# Patient Record
Sex: MALE | Race: BLACK OR AFRICAN AMERICAN | NOT HISPANIC OR LATINO | Employment: FULL TIME | URBAN - METROPOLITAN AREA
[De-identification: names, ages, dates, MRNs, and addresses within clinical notes are randomized per-mention and may not be internally consistent; named-entity substitution may affect disease eponyms.]

---

## 2023-03-19 PROBLEM — N20.0 KIDNEY STONES: Status: ACTIVE | Noted: 2023-03-19

## 2023-03-19 PROBLEM — M48.061 SPINAL STENOSIS OF LUMBAR REGION: Status: ACTIVE | Noted: 2023-03-19

## 2023-03-19 PROBLEM — Z86.0100 HISTORY OF COLON POLYPS: Status: ACTIVE | Noted: 2023-03-19

## 2023-03-19 PROBLEM — M43.16 SPONDYLOLISTHESIS, LUMBAR REGION: Status: ACTIVE | Noted: 2023-03-19

## 2023-03-19 PROBLEM — Z86.19 H/O HELICOBACTER INFECTION: Status: ACTIVE | Noted: 2023-03-19

## 2023-03-19 PROBLEM — G43.909 MIGRAINE WITHOUT STATUS MIGRAINOSUS, NOT INTRACTABLE: Status: ACTIVE | Noted: 2023-03-19

## 2023-03-19 PROBLEM — G44.219: Status: ACTIVE | Noted: 2023-03-19

## 2023-03-19 PROBLEM — Z85.51 HISTORY OF BLADDER CANCER: Status: ACTIVE | Noted: 2023-03-19

## 2023-03-19 PROBLEM — J38.3 VOCAL CORD DYSFUNCTION: Status: ACTIVE | Noted: 2023-03-19

## 2023-03-19 PROBLEM — M47.812 SPONDYLOSIS OF CERVICAL REGION WITHOUT MYELOPATHY OR RADICULOPATHY: Status: ACTIVE | Noted: 2023-03-19

## 2023-03-19 PROBLEM — E55.9 VITAMIN D DEFICIENCY: Status: ACTIVE | Noted: 2023-03-19

## 2023-03-19 PROBLEM — Z79.899 MEDICATION MANAGEMENT: Status: ACTIVE | Noted: 2023-03-19

## 2023-03-19 PROBLEM — Z86.010 HISTORY OF COLON POLYPS: Status: ACTIVE | Noted: 2023-03-19

## 2023-03-19 PROBLEM — M18.12 LOCALIZED PRIMARY OSTEOARTHRITIS OF CARPOMETACARPAL JOINT OF LEFT THUMB: Status: ACTIVE | Noted: 2023-03-19

## 2023-03-19 PROBLEM — Z85.850 HISTORY OF THYROID CANCER: Status: ACTIVE | Noted: 2023-03-19

## 2023-03-19 PROBLEM — M47.816 LUMBAR SPONDYLOSIS: Status: ACTIVE | Noted: 2023-03-19

## 2023-03-19 PROBLEM — Z00.00 ROUTINE ADULT HEALTH MAINTENANCE: Chronic | Status: ACTIVE | Noted: 2023-03-19

## 2023-03-19 PROBLEM — K44.9 HIATAL HERNIA: Status: ACTIVE | Noted: 2023-03-19

## 2023-03-19 PROBLEM — E61.1 IRON DEFICIENCY: Status: ACTIVE | Noted: 2023-03-19

## 2023-03-19 PROBLEM — E03.9 HYPOTHYROIDISM, ADULT: Status: ACTIVE | Noted: 2023-03-19

## 2023-03-19 PROBLEM — M51.9 LUMBAR DISC DISEASE: Status: ACTIVE | Noted: 2023-03-19

## 2023-03-19 PROBLEM — N40.0 BPH (BENIGN PROSTATIC HYPERPLASIA): Status: ACTIVE | Noted: 2023-03-19

## 2023-03-19 PROBLEM — K21.9 CHRONIC GERD: Status: ACTIVE | Noted: 2023-03-19

## 2023-03-19 PROBLEM — E80.4 GILBERT SYNDROME: Status: ACTIVE | Noted: 2023-03-19

## 2023-04-18 DIAGNOSIS — R79.89 ABNORMAL LIVER FUNCTION TESTS: Primary | ICD-10-CM

## 2023-04-18 LAB
ALANINE AMINOTRANSFERASE (SGPT) (U/L) IN SER/PLAS: 58 U/L (ref 10–52)
ALBUMIN (G/DL) IN SER/PLAS: 4.7 G/DL (ref 3.4–5)
ALKALINE PHOSPHATASE (U/L) IN SER/PLAS: 72 U/L (ref 33–120)
ANION GAP IN SER/PLAS: 11 MMOL/L (ref 10–20)
APPEARANCE, URINE: NORMAL
ASCORBIC ACID: NORMAL MG/DL
ASPARTATE AMINOTRANSFERASE (SGOT) (U/L) IN SER/PLAS: 29 U/L (ref 9–39)
BASOPHILS (10*3/UL) IN BLOOD BY AUTOMATED COUNT: 0.02 X10E9/L (ref 0–0.1)
BASOPHILS/100 LEUKOCYTES IN BLOOD BY AUTOMATED COUNT: 0.6 % (ref 0–2)
BILIRUBIN TOTAL (MG/DL) IN SER/PLAS: 1.4 MG/DL (ref 0–1.2)
BILIRUBIN, URINE: NORMAL
BLOOD, URINE: NORMAL
CALCIUM (MG/DL) IN SER/PLAS: 9.9 MG/DL (ref 8.6–10.3)
CARBON DIOXIDE, TOTAL (MMOL/L) IN SER/PLAS: 32 MMOL/L (ref 21–32)
CHLORIDE (MMOL/L) IN SER/PLAS: 102 MMOL/L (ref 98–107)
CHOLESTEROL (MG/DL) IN SER/PLAS: 159 MG/DL (ref 0–199)
CHOLESTEROL IN HDL (MG/DL) IN SER/PLAS: 43.4 MG/DL
CHOLESTEROL/HDL RATIO: 3.7
COLOR, URINE: NORMAL
CREATININE (MG/DL) IN SER/PLAS: 0.75 MG/DL (ref 0.5–1.3)
EOSINOPHILS (10*3/UL) IN BLOOD BY AUTOMATED COUNT: 0.01 X10E9/L (ref 0–0.7)
EOSINOPHILS/100 LEUKOCYTES IN BLOOD BY AUTOMATED COUNT: 0.3 % (ref 0–6)
ERYTHROCYTE DISTRIBUTION WIDTH (RATIO) BY AUTOMATED COUNT: 12.6 % (ref 11.5–14.5)
ERYTHROCYTE MEAN CORPUSCULAR HEMOGLOBIN CONCENTRATION (G/DL) BY AUTOMATED: 31.6 G/DL (ref 32–36)
ERYTHROCYTE MEAN CORPUSCULAR VOLUME (FL) BY AUTOMATED COUNT: 84 FL (ref 80–100)
ERYTHROCYTES (10*6/UL) IN BLOOD BY AUTOMATED COUNT: 6.17 X10E12/L (ref 4.5–5.9)
FERRITIN (UG/LL) IN SER/PLAS: 77 UG/L (ref 20–300)
GFR MALE: >90 ML/MIN/1.73M2
GLUCOSE (MG/DL) IN SER/PLAS: 98 MG/DL (ref 74–99)
GLUCOSE, URINE: NORMAL
HEMATOCRIT (%) IN BLOOD BY AUTOMATED COUNT: 51.6 % (ref 41–52)
HEMOGLOBIN (G/DL) IN BLOOD: 16.3 G/DL (ref 13.5–17.5)
IMMATURE GRANULOCYTES/100 LEUKOCYTES IN BLOOD BY AUTOMATED COUNT: 0.3 % (ref 0–0.9)
IRON (UG/DL) IN SER/PLAS: 118 UG/DL (ref 35–150)
IRON BINDING CAPACITY (UG/DL) IN SER/PLAS: 425 UG/DL (ref 240–445)
IRON SATURATION (%) IN SER/PLAS: 28 % (ref 25–45)
KETONES, URINE: NORMAL
LDL: 94 MG/DL (ref 0–99)
LEUKOCYTE ESTERASE, URINE: NORMAL
LEUKOCYTES (10*3/UL) IN BLOOD BY AUTOMATED COUNT: 3.3 X10E9/L (ref 4.4–11.3)
LYMPHOCYTES (10*3/UL) IN BLOOD BY AUTOMATED COUNT: 1.42 X10E9/L (ref 1.2–4.8)
LYMPHOCYTES/100 LEUKOCYTES IN BLOOD BY AUTOMATED COUNT: 43.7 % (ref 13–44)
MONOCYTES (10*3/UL) IN BLOOD BY AUTOMATED COUNT: 0.18 X10E9/L (ref 0.1–1)
MONOCYTES/100 LEUKOCYTES IN BLOOD BY AUTOMATED COUNT: 5.5 % (ref 2–10)
NEUTROPHILS (10*3/UL) IN BLOOD BY AUTOMATED COUNT: 1.61 X10E9/L (ref 1.2–7.7)
NEUTROPHILS/100 LEUKOCYTES IN BLOOD BY AUTOMATED COUNT: 49.6 % (ref 40–80)
NITRITE, URINE: NORMAL
NRBC (PER 100 WBCS) BY AUTOMATED COUNT: 0 /100 WBC (ref 0–0)
PH, URINE: NORMAL
PLATELETS (10*3/UL) IN BLOOD AUTOMATED COUNT: 183 X10E9/L (ref 150–450)
POTASSIUM (MMOL/L) IN SER/PLAS: 4.5 MMOL/L (ref 3.5–5.3)
PROTEIN TOTAL: 7.6 G/DL (ref 6.4–8.2)
PROTEIN, URINE: NORMAL
SODIUM (MMOL/L) IN SER/PLAS: 140 MMOL/L (ref 136–145)
SPECIFIC GRAVITY, URINE: NORMAL
THYROTROPIN (MIU/L) IN SER/PLAS BY DETECTION LIMIT <= 0.05 MIU/L: 0.1 MIU/L (ref 0.44–3.98)
THYROXINE (T4) FREE (NG/DL) IN SER/PLAS: 1.18 NG/DL (ref 0.61–1.12)
TRIGLYCERIDE (MG/DL) IN SER/PLAS: 108 MG/DL (ref 0–149)
UREA NITROGEN (MG/DL) IN SER/PLAS: 7 MG/DL (ref 6–23)
UROBILINOGEN, URINE: NORMAL
VLDL: 22 MG/DL (ref 0–40)

## 2023-04-19 LAB
CALCIDIOL (25 OH VITAMIN D3) (NG/ML) IN SER/PLAS: 40 NG/ML
COBALAMIN (VITAMIN B12) (PG/ML) IN SER/PLAS: 470 PG/ML (ref 211–911)

## 2023-04-21 LAB
PROSTATE SPECIFIC AG (NG/ML) IN SER/PLAS: 3.6 NG/ML (ref 0–4)
PROSTATE SPECIFIC AG FREE (NG/ML) IN SER/PLAS: 0.8 NG/ML
PROSTATE SPECIFIC AG FREE/PROSTATE SPECIFIC AG TOTAL IN SER/PLAS: 22 %

## 2023-04-25 ENCOUNTER — OFFICE VISIT (OUTPATIENT)
Dept: PRIMARY CARE | Facility: CLINIC | Age: 58
End: 2023-04-25
Payer: COMMERCIAL

## 2023-04-25 VITALS
OXYGEN SATURATION: 96 % | HEIGHT: 67 IN | SYSTOLIC BLOOD PRESSURE: 97 MMHG | HEART RATE: 99 BPM | WEIGHT: 146.5 LBS | BODY MASS INDEX: 22.99 KG/M2 | TEMPERATURE: 97.8 F | DIASTOLIC BLOOD PRESSURE: 61 MMHG

## 2023-04-25 DIAGNOSIS — N40.0 BENIGN PROSTATIC HYPERPLASIA, UNSPECIFIED WHETHER LOWER URINARY TRACT SYMPTOMS PRESENT: ICD-10-CM

## 2023-04-25 DIAGNOSIS — E61.1 IRON DEFICIENCY: ICD-10-CM

## 2023-04-25 DIAGNOSIS — Z00.00 ROUTINE ADULT HEALTH MAINTENANCE: Primary | Chronic | ICD-10-CM

## 2023-04-25 DIAGNOSIS — E03.9 HYPOTHYROIDISM, ADULT: ICD-10-CM

## 2023-04-25 DIAGNOSIS — R74.01 ELEVATED ALT MEASUREMENT: ICD-10-CM

## 2023-04-25 DIAGNOSIS — M47.812 SPONDYLOSIS OF CERVICAL REGION WITHOUT MYELOPATHY OR RADICULOPATHY: ICD-10-CM

## 2023-04-25 DIAGNOSIS — E55.9 VITAMIN D DEFICIENCY: ICD-10-CM

## 2023-04-25 DIAGNOSIS — K21.9 CHRONIC GERD: ICD-10-CM

## 2023-04-25 DIAGNOSIS — L60.9 NAIL ABNORMALITY: ICD-10-CM

## 2023-04-25 DIAGNOSIS — D72.819 LEUKOPENIA, UNSPECIFIED TYPE: ICD-10-CM

## 2023-04-25 PROCEDURE — 1036F TOBACCO NON-USER: CPT | Performed by: INTERNAL MEDICINE

## 2023-04-25 PROCEDURE — 99396 PREV VISIT EST AGE 40-64: CPT | Performed by: INTERNAL MEDICINE

## 2023-04-25 RX ORDER — ALFUZOSIN HYDROCHLORIDE 10 MG/1
1 TABLET, EXTENDED RELEASE ORAL NIGHTLY
COMMUNITY
Start: 2021-09-21 | End: 2023-09-01 | Stop reason: SDUPTHER

## 2023-04-25 RX ORDER — PANTOPRAZOLE SODIUM 20 MG/1
20 TABLET, DELAYED RELEASE ORAL
COMMUNITY
End: 2023-09-01 | Stop reason: SDUPTHER

## 2023-04-25 RX ORDER — FERROUS SULFATE 325(65) MG
65 TABLET ORAL
COMMUNITY
Start: 2020-07-21

## 2023-04-25 RX ORDER — DIAZEPAM 10 MG/1
10 TABLET ORAL EVERY 8 HOURS PRN
Qty: 21 TABLET | Refills: 0 | Status: SHIPPED | OUTPATIENT
Start: 2023-04-25 | End: 2024-03-19 | Stop reason: WASHOUT

## 2023-04-25 RX ORDER — MULTIVITAMIN
1 TABLET ORAL DAILY
COMMUNITY

## 2023-04-25 RX ORDER — LEVOTHYROXINE SODIUM 125 UG/1
125 TABLET ORAL
COMMUNITY
Start: 2018-05-21

## 2023-04-25 RX ORDER — DIAZEPAM 10 MG/1
1 TABLET ORAL 2 TIMES DAILY PRN
COMMUNITY
Start: 2019-03-08 | End: 2023-04-25 | Stop reason: SDUPTHER

## 2023-04-25 RX ORDER — CHOLECALCIFEROL (VITAMIN D3) 25 MCG
25 TABLET ORAL DAILY
COMMUNITY

## 2023-04-25 ASSESSMENT — PATIENT HEALTH QUESTIONNAIRE - PHQ9
SUM OF ALL RESPONSES TO PHQ9 QUESTIONS 1 AND 2: 0
2. FEELING DOWN, DEPRESSED OR HOPELESS: NOT AT ALL
1. LITTLE INTEREST OR PLEASURE IN DOING THINGS: NOT AT ALL

## 2023-04-25 NOTE — PROGRESS NOTES
Reason for Visit: Annual Physical Exam    Assessment and Plan:  Problem List Items Addressed This Visit          High    Routine adult health maintenance - Primary (Chronic)    Overview     TDaP 3/7/18  COVID-19 Moderna 3/10/21, 4/15/21, 11/18/21, 4/25/22, 12/9/22  FLu vaccine 2018, 10/1/19, 10/1/20, 10/1/22  Cscope 2015 (5yrs); 8/2020 (5 y)  6/29/20, PSA 3.9, 3.58 9/3/18; 1.56 4/9/21, 1.79 4/1/22 ; 4/21/23 3.6         Current Assessment & Plan     Annual Wellness exam completed   Preventive Health history reviewed:  Vaccines today: none  Labs ordered    Depression Screening done         Relevant Orders    Urinalysis with Reflex Microscopic    Comprehensive Metabolic Panel    CBC and Auto Differential    Lipid Panel       Medium    BPH (benign prostatic hyperplasia)    Overview     with elevated PSA  Has seen Urology at [facility]  On Flomax in past(dizziness, ED) until changed to Uroxatral in 10/21  9/18:  Prostate, left base, biopsy (A) - Benign prostate tissue.         Current Assessment & Plan     Seeing Urology soon for POC as symptoms persiting on max dose medication         Relevant Orders    PSA, Total and Free    Chronic GERD    Overview     On PPI         Hypothyroidism, adult    Overview     post thyroidectomy from papillary CA.   TSH needs to be <0.04  on synthroid         Current Assessment & Plan     Emailed Dr Vogt to inquire if any dose change needed  Await response         Relevant Orders    TSH with reflex to Free T4 if abnormal    Iron deficiency    Overview     on PO supp  continue         Relevant Orders    Iron and TIBC    Leukopenia    Overview     6/26/20: 2.9  Benign cause chava  Will monitor         Spondylosis of cervical region without myelopathy or radiculopathy    Overview     2/21 MRI: There is loss of height and signal and intervertebral disc spaces throughout the cervical spine. There are associated small bulging discs, uncovertebral joint osteophytes and facet hypertrophy. There is no  measurable canal stenosis, focal disc herniation, cord compression or nerve root compression  Uses Valium prn cervicogenic headaches         Current Assessment & Plan     Physical Therapy   Minnie Rodkey/[employer]  [phone]           Relevant Medications    diazePAM (Valium) 10 mg tablet    Vitamin D deficiency    Overview     Goal 30-40 given hx kidney stones  Supratherapeutic on 5K daily         Relevant Orders    Vitamin D, Total     Other Visit Diagnoses       Elevated ALT measurement        ? due to medication (NSAID/Tylenol)  Will repeat hydrated, nonfasting and off those meds  If still high will get US liver    Nail abnormality        Relevant Orders    Zinc    Referral to Dermatology              HPI: CPE   Fingernails are looking strange  Pt helping neck    Labs reviewed:  Component      Latest Ref Rng 4/18/2023   WBC      4.4 - 11.3 x10E9/L 3.3 (L)    nRBC      0.0 - 0.0 /100 WBC 0.0    RBC      4.50 - 5.90 x10E12/L 6.17 (H)    HEMOGLOBIN      13.5 - 17.5 g/dL 16.3    HEMATOCRIT      41.0 - 52.0 % 51.6    MCV      80 - 100 fL 84    MCHC      32.0 - 36.0 g/dL 31.6 (L)    Platelets      150 - 450 x10E9/L 183    RED CELL DISTRIBUTION WIDTH      11.5 - 14.5 % 12.6    Neutrophils %      40.0 - 80.0 % 49.6    Immature Granulocytes %, Automated      0.0 - 0.9 % 0.3    Lymphocytes %      13.0 - 44.0 % 43.7    Monocytes %      2.0 - 10.0 % 5.5    Eosinophils %      0.0 - 6.0 % 0.3    Basophils %      0.0 - 2.0 % 0.6    Neutrophils Absolute      1.20 - 7.70 x10E9/L 1.61    Lymphocytes Absolute      1.20 - 4.80 x10E9/L 1.42    Monocytes Absolute      0.10 - 1.00 x10E9/L 0.18    Eosinophils Absolute      0.00 - 0.70 x10E9/L 0.01    Basophils Absolute      0.00 - 0.10 x10E9/L 0.02    GLUCOSE      74 - 99 mg/dL 98    SODIUM      136 - 145 mmol/L 140    POTASSIUM      3.5 - 5.3 mmol/L 4.5    CHLORIDE      98 - 107 mmol/L 102    Bicarbonate      21 - 32 mmol/L 32    Anion Gap      10 - 20 mmol/L 11    Blood  "Urea Nitrogen      6 - 23 mg/dL 7    Creatinine      0.50 - 1.30 mg/dL 0.75    GFR MALE      >90 mL/min/1.73m2 >90    Calcium      8.6 - 10.3 mg/dL 9.9    Albumin      3.4 - 5.0 g/dL 4.7    Alkaline Phosphatase      33 - 120 U/L 72    Total Protein      6.4 - 8.2 g/dL 7.6    AST      9 - 39 U/L 29    Bilirubin Total      0.0 - 1.2 mg/dL 1.4 (H)    ALT      10 - 52 U/L 58 (H)    CHOLESTEROL      0 - 199 mg/dL 159    HDL CHOLESTEROL      mg/dL 43.4    Cholesterol/HDL Ratio 3.7    LDL      0 - 99 mg/dL 94    VLDL      0 - 40 mg/dL 22    TRIGLYCERIDES      0 - 149 mg/dL 108    PSA      0.0 - 4.0 ng/mL 3.6    PSA, Free      ng/mL 0.8    PSA, Free Pct      % 22    IRON      35 - 150 ug/dL 118    TIBC      240 - 445 ug/dL 425    % Saturation      25 - 45 % 28    Vitamin D, 25-Hydroxy, Total      ng/mL 40    FERRITIN      20 - 300 ug/L 77    Vitamin B12      211 - 911 pg/mL 470    Thyroid Stimulating Hormone      0.44 - 3.98 mIU/L 0.10 (L)    Thyroxine, Free      0.61 - 1.12 ng/dL 1.18 (H)    Sees Endo in July  No new meds  Uses Tylenol and Advil and was taking it at time of these labs      ROS otherwise negative aside from what was mentioned above in HPI.    Vitals  BP 97/61   Pulse 99   Temp 36.6 °C (97.8 °F)   Ht 1.689 m (5' 6.5\")   Wt 66.5 kg (146 lb 8 oz)   SpO2 96%   BMI 23.29 kg/m²   Body mass index is 23.29 kg/m².  Physical Exam  Gen: Alert, NAD  HEENT:  Normal exam  Neck:  No masses/nodes palpable; Thyroid nontender and without nodules; No EVELIO  Respiratory:  Lungs CTAB  CV: RRR  Neuro:  Gross motor and sensory intact  Skin:  No suspicious lesions present  Breast: No masses or axillary lymphadenopathy      Active Problem List  Patient Active Problem List   Diagnosis    BPH (benign prostatic hyperplasia)    Chronic GERD    Gilbert syndrome    H/O Helicobacter infection    Hiatal hernia    History of bladder cancer    History of colon polyps    History of thyroid cancer    Hypothyroidism, adult    Iron " deficiency    Kidney stones    Localized primary osteoarthritis of carpometacarpal joint of left thumb    Lumbar disc disease    Lumbar spondylosis    Spinal stenosis of lumbar region    Spondylolisthesis, lumbar region    Migraine without status migrainosus, not intractable    Spondylosis of cervical region without myelopathy or radiculopathy    TTH (tension-type headache), infrequent episodic type    Vitamin D deficiency    Routine adult health maintenance    Medication management    Vocal cord dysfunction    Leukopenia       Comprehensive Medical/Surgical/Social/Family History  Past Medical History:   Diagnosis Date    H/O CT scan     4/22: CT Calcium score: 0 The visualized segments of the lungs demonstrates minimal bullous emphysematous changes. There is basilar atelectasis.. 10/20: CT enterography: HH, moderately enlarged prostate 4/7/21: CT urogram: NO HYDRONEPHROSIS OR NEPHROLITHIASIS.  NO ABNORMALLY ENHANCED RENAL OR BLADDER MASS.  PROSTATE HYPERTROPHY.    H/O CT scan of abdomen     12/18: cyst left kidney 7mm, small HH 1/21: IMPRESSION: 3 mm obstructing calculus at the left ureterovesical junction resulting in mild hydroureter and hydronephrosis. Perinephric and periureteral stranding is most likely related to the obstruction but infection is not excluded. Prostatomegaly.    H/O CT scan of brain     12/18: normal    H/O CT scan of chest     2016: Few scattered bilateral tiny nodular/groundglass opacities <4mm    H/O diagnostic ultrasound     2015 US kidney: Normal; prostate gland is enlarged indenting the base of the urinary bladder , Left ureteral stent    H/O magnetic resonance imaging     2/12: mri SHOULDER: 2. Mild tendinosis of the distal supraspinatus tendon without discrete tear. 3. Chronic degenerative tear of the superior labrum within limits of non arthrographic study.    H/O magnetic resonance imaging of cervical spine     2/21: There is loss of height and signal and intervertebral disc spaces  throughout the cervical spine. There are associated small bulging discs, uncovertebral joint osteophytes and facet hypertrophy. There is no measurable canal stenosis, focal disc herniation, cord compression or nerve root compression    H/O magnetic resonance imaging of lumbar spine     2009: Disc protrusions on the left at L4-5 and L5-S1, with impingement . s/p PT and injections; Central and slightly left-sided disk protrusion L5/S1 2017: Multilevel discogenic and facet hypertrophic degenerative change as  described above. Left laminotomy at L4 and L5. Recurrent disc  protrusion versus granulation tissue/epidural scar at L4-L5 and contacts  the left L5 nerve root    History of PFTs     2015 (-)     Past Surgical History:   Procedure Laterality Date    BACK SURGERY  03/24/2020 7/19: revision decompression L5-S1 with fusion    BLADDER SURGERY  12/07/2018    Bladder CA removal 1/21 found incidentally on ureteroscopy low grade noninvasive urothelial cell carcinoma of the bladder    COLONOSCOPY  08/05/2020    5/15: Three polyps were found in the rectum.  measuring 3-5 mm; hyperplastic polyps; polyps (5 years) 8/2020: ileium normal; colon normal; no specimens; 5 y    CYSTOSCOPY  04/04/2022 4/23: Normal ;4/21: Normal ([facility]) 4/22: Normal aside from Prostate: Moderate lateral lobes with prolapse    ESOPHAGOGASTRODUODENOSCOPY  08/05/2020 2005: SMALL HIATAL HERNIA, CHRONIC GASTRITIS, negative for H Pylori 2012 : normal 8/2020: small hiatal hernia    LUMBAR LAMINECTOMY  08/14/2019 7/19/19: 1. revision L4-S1 leminectomy. 2. Vlia/radical decompression L5/S1, excision recurrent HNP. 3. PSF w instrumentation L5/S1. 4. TLIF w cage L5/S1    OTHER SURGICAL HISTORY  12/07/2018    Tonsillectomy    PROSTATE BIOPSY  03/24/2020 9/18: FINAL DIAGNOSIS  1. Prostate, left base, biopsy (A) - Benign prostate tissue.    THYROIDECTOMY  04/12/2021    Bladder surgery    UPPER GASTROINTESTINAL ENDOSCOPY  10/05/2020    Capsule endoscopy  10/20: ? debris vs ulceration noted at 3:57 35sec entended capsule time in stomach and at ileocecal valve Capsule remained in small bowel at end of study    URETEROSCOPY  2021: Left, stent placement, laser lithotripsy     Social History     Social History Narrative    , 3 daughters    Works as  for [employer]    Nonsmoker    No ETOH    ---    Family History:    F: Diabetes, CAD/MI  ( 74)    M Kidney stones, hypotension, OA    S: Migraine, RA                             S:        B:                                   Aunts/cousin with rectal CA; Aunt other side of family has rectal CA    2Aunt:  Colon Cancer       Allergies and Medications  Penicillins, Tamsulosin, Tizanidine, and Latex  Current Outpatient Medications   Medication Instructions    alfuzosin (Uroxatral) 10 mg 24 hr tablet 1 tablet, oral, Nightly    cholecalciferol (VITAMIN D-3) 25 mcg, oral, Daily    diazePAM (VALIUM) 10 mg, oral, Every 8 hours PRN    ferrous sulfate 325 (65 Fe) MG tablet oral    levothyroxine (SYNTHROID, LEVOXYL) 125 mcg, oral, Daily before breakfast    multivitamin tablet 1 tablet, oral, Daily    pantoprazole (PROTONIX) 20 mg, oral, Daily before breakfast

## 2023-04-25 NOTE — ASSESSMENT & PLAN NOTE
Annual Wellness exam completed   Preventive Health history reviewed:  Vaccines today: none  Labs ordered    Depression Screening done

## 2023-07-24 ENCOUNTER — PATIENT OUTREACH (OUTPATIENT)
Dept: CARE COORDINATION | Facility: CLINIC | Age: 58
End: 2023-07-24
Payer: COMMERCIAL

## 2023-07-24 NOTE — PROGRESS NOTES
Discharge Facility: [facility]  Discharge Diagnosis: UTI, recent urology surgery  Admission Date: 7/21/23  Discharge Date: 7/22/23    PCP Appointment Date: deferred  Specialist Appointment Date: TBD patient states he will get a call from office to see  Hospital Encounter and Summary: Linked  See discharge assessment below for further details    Engagement  Call Start Time: 1136 (7/24/2023 11:41 AM)    Medications  Medications reviewed with patient/caregiver?: Yes (7/24/2023 11:41 AM)  Is the patient having any side effects they believe may be caused by any medication additions or changes?: No (7/24/2023 11:41 AM)  Does the patient have all medications ordered at discharge?: Yes (7/24/2023 11:41 AM)  Care Management Interventions: Provided patient education (7/24/2023 11:41 AM)  Prescription Comments: Patient was on cardura in hospital explained to him per review of chart his alfuzosin appears to not have been in the formulary of the hospital pharmacy so this was why he was on this in the hospital. He will remain on alfuzosin outpatient, complete 10 days course of levofloxacin and use colace as needed (7/24/2023 11:41 AM)  Is the patient taking all medications as directed (includes completed medication regime)?: Yes (7/24/2023 11:41 AM)  Care Management Interventions: Advised patient to call provider (7/24/2023 11:41 AM)  Medication Comments: He will contact urology with any questions on urology related medications (7/24/2023 11:41 AM)    Appointments  Does the patient have a primary care provider?: Yes (7/24/2023 11:41 AM)  Care Management Interventions: Educated patient on importance of making appointment (7/24/2023 11:41 AM)  Has the patient kept scheduled appointments due by today?: Yes (7/24/2023 11:41 AM)  Care Management Interventions: Advised to schedule with specialist (7/24/2023 11:41 AM)    Patient Teaching  Does the patient have access to their discharge instructions?: Yes (7/24/2023 11:41 AM)  Care  Management Interventions: Reviewed instructions with patient (7/24/2023 11:41 AM)  What is the patient's perception of their health status since discharge?: Improving (7/24/2023 11:41 AM)  Is the patient/caregiver able to teach back the hierarchy of who to call/visit for symptoms/problems? PCP, Specialist, Home Health nurse, Urgent Care, ED, 911: Yes (7/24/2023 11:41 AM)  Patient/Caregiver Education Comments: He is aware to seek care with any further burning with urination or hematuria (7/24/2023 11:41 AM)    Wrap Up  Wrap Up Additional Comments: Prefers to see urology Dr Quesada within 1-2 weeks and will call PCP office if he is unable to get into urology soon within hospital discharge period. He had a laser prostatectomy on 7/19 prior to hospitalization. (7/24/2023 11:41 AM)  Call End Time: 1141 (7/24/2023 11:41 AM)

## 2023-08-04 ENCOUNTER — DOCUMENTATION (OUTPATIENT)
Dept: PRIMARY CARE | Facility: CLINIC | Age: 58
End: 2023-08-04
Payer: COMMERCIAL

## 2023-09-01 ENCOUNTER — OFFICE VISIT (OUTPATIENT)
Dept: PRIMARY CARE | Facility: CLINIC | Age: 58
End: 2023-09-01
Payer: COMMERCIAL

## 2023-09-01 VITALS
BODY MASS INDEX: 25.39 KG/M2 | DIASTOLIC BLOOD PRESSURE: 64 MMHG | HEART RATE: 82 BPM | HEIGHT: 66 IN | SYSTOLIC BLOOD PRESSURE: 100 MMHG | WEIGHT: 158 LBS

## 2023-09-01 DIAGNOSIS — M79.674 TOE PAIN, RIGHT: ICD-10-CM

## 2023-09-01 DIAGNOSIS — M21.70 LEG LENGTH DISCREPANCY: ICD-10-CM

## 2023-09-01 DIAGNOSIS — K21.9 CHRONIC GERD: ICD-10-CM

## 2023-09-01 DIAGNOSIS — M53.3 SACROILIAC DYSFUNCTION: Primary | ICD-10-CM

## 2023-09-01 PROCEDURE — 99214 OFFICE O/P EST MOD 30 MIN: CPT | Performed by: INTERNAL MEDICINE

## 2023-09-01 PROCEDURE — 1036F TOBACCO NON-USER: CPT | Performed by: INTERNAL MEDICINE

## 2023-09-01 RX ORDER — PANTOPRAZOLE SODIUM 20 MG/1
20 TABLET, DELAYED RELEASE ORAL
Qty: 90 TABLET | Refills: 3 | Status: SHIPPED | OUTPATIENT
Start: 2023-09-01

## 2023-09-01 RX ORDER — ALFUZOSIN HYDROCHLORIDE 10 MG/1
10 TABLET, EXTENDED RELEASE ORAL NIGHTLY
COMMUNITY
End: 2023-12-11 | Stop reason: ALTCHOICE

## 2023-09-01 RX ORDER — METHYLPREDNISOLONE 4 MG/1
TABLET ORAL
Qty: 21 TABLET | Refills: 0 | Status: SHIPPED | OUTPATIENT
Start: 2023-09-01 | End: 2023-09-08

## 2023-09-01 ASSESSMENT — PATIENT HEALTH QUESTIONNAIRE - PHQ9
2. FEELING DOWN, DEPRESSED OR HOPELESS: NOT AT ALL
1. LITTLE INTEREST OR PLEASURE IN DOING THINGS: NOT AT ALL
SUM OF ALL RESPONSES TO PHQ9 QUESTIONS 1 AND 2: 0

## 2023-09-01 NOTE — PROGRESS NOTES
CC/HPI: c/o lower back pain last week , left side  Can walk,sit,and lay down fine   It hurts from sitting to standing   Fine once up and walking around  No injury  Tried TENS and helped  Able to walk a mile and no pain    Right big toe joint  Pain on and off for a while   Persistent in last week   Denies injury   Hurts at rest and     Declines flu shot     Saw urology, reviewed note, had laser to prostate  Complicated by infection afterwards    No change in thryoid med per endo    He saw Minnie Sigala and it helped but too $$    Assessment and Plan:  Problem List Items Addressed This Visit          Medium    Chronic GERD    Overview     On PPI         Relevant Medications    pantoprazole (ProtoNix) 20 mg EC tablet     Other Visit Diagnoses       Sacroiliac dysfunction    -  Primary    Suspect cause for his pain  will try ART with Franchesca Alegria over weekend    Relevant Medications    methylPREDNISolone (Medrol Dospak) 4 mg tablets    Leg length discrepancy        Toe pain, right        Likley DJD  will image  May need podiatry  See if Franchesca can help    Relevant Medications    methylPREDNISolone (Medrol Dospak) 4 mg tablets    Other Relevant Orders    XR toe right 2+ views    Referral to Podiatry              Active Problem List  Patient Active Problem List   Diagnosis    BPH (benign prostatic hyperplasia)    Chronic GERD    Gilbert syndrome    H/O Helicobacter infection    Hiatal hernia    History of bladder cancer    History of colon polyps    History of thyroid cancer    Hypothyroidism, adult    Iron deficiency    Kidney stones    Localized primary osteoarthritis of carpometacarpal joint of left thumb    Lumbar disc disease    Lumbar spondylosis    Spinal stenosis of lumbar region    Spondylolisthesis, lumbar region    Migraine without status migrainosus, not intractable    Spondylosis of cervical region without myelopathy or radiculopathy    TTH (tension-type headache), infrequent episodic type    Vitamin D  deficiency    Routine adult health maintenance    Medication management    Vocal cord dysfunction    Leukopenia       Comprehensive Medical/Surgical/Social/Family History  Past Medical History:   Diagnosis Date    H/O CT scan     4/22: CT Calcium score: 0 The visualized segments of the lungs demonstrates minimal bullous emphysematous changes. There is basilar atelectasis.. 10/20: CT enterography: HH, moderately enlarged prostate 4/7/21: CT urogram: NO HYDRONEPHROSIS OR NEPHROLITHIASIS.  NO ABNORMALLY ENHANCED RENAL OR BLADDER MASS.  PROSTATE HYPERTROPHY.    H/O CT scan of abdomen     12/18: cyst left kidney 7mm, small HH 1/21: IMPRESSION: 3 mm obstructing calculus at the left ureterovesical junction resulting in mild hydroureter and hydronephrosis. Perinephric and periureteral stranding is most likely related to the obstruction but infection is not excluded. Prostatomegaly.    H/O CT scan of brain     12/18: normal    H/O CT scan of chest     2016: Few scattered bilateral tiny nodular/groundglass opacities <4mm    H/O diagnostic ultrasound     2015 US kidney: Normal; prostate gland is enlarged indenting the base of the urinary bladder , Left ureteral stent    H/O magnetic resonance imaging     2/12: mri SHOULDER: 2. Mild tendinosis of the distal supraspinatus tendon without discrete tear. 3. Chronic degenerative tear of the superior labrum within limits of non arthrographic study.    H/O magnetic resonance imaging of cervical spine     2/21: There is loss of height and signal and intervertebral disc spaces throughout the cervical spine. There are associated small bulging discs, uncovertebral joint osteophytes and facet hypertrophy. There is no measurable canal stenosis, focal disc herniation, cord compression or nerve root compression    H/O magnetic resonance imaging of lumbar spine     2009: Disc protrusions on the left at L4-5 and L5-S1, with impingement . s/p PT and injections; Central and slightly left-sided  disk protrusion L5/S1 2017: Multilevel discogenic and facet hypertrophic degenerative change as  described above. Left laminotomy at L4 and L5. Recurrent disc  protrusion versus granulation tissue/epidural scar at L4-L5 and contacts  the left L5 nerve root    History of PFTs     2015 (-)     Past Surgical History:   Procedure Laterality Date    BACK SURGERY  03/24/2020 7/19: revision decompression L5-S1 with fusion    BLADDER SURGERY  12/07/2018    Bladder CA removal 1/21 found incidentally on ureteroscopy low grade noninvasive urothelial cell carcinoma of the bladder    COLONOSCOPY  08/05/2020    5/15: Three polyps were found in the rectum.  measuring 3-5 mm; hyperplastic polyps; polyps (5 years) 8/2020: ileium normal; colon normal; no specimens; 5 y    CYSTOSCOPY  04/04/2022 4/23: Normal ;4/21: Normal ([facility]) 4/22: Normal aside from Prostate: Moderate lateral lobes with prolapse    ESOPHAGOGASTRODUODENOSCOPY  08/05/2020    2005: SMALL HIATAL HERNIA, CHRONIC GASTRITIS, negative for H Pylori 2012 : normal 8/2020: small hiatal hernia    LUMBAR LAMINECTOMY  08/14/2019 7/19/19: 1. revision L4-S1 leminectomy. 2. Vila/radical decompression L5/S1, excision recurrent HNP. 3. PSF w instrumentation L5/S1. 4. TLIF w cage L5/S1    OTHER SURGICAL HISTORY  12/07/2018    Tonsillectomy    PROSTATE BIOPSY  03/24/2020 9/18: FINAL DIAGNOSIS  1. Prostate, left base, biopsy (A) - Benign prostate tissue.    THYROIDECTOMY  04/12/2021    Bladder surgery    UPPER GASTROINTESTINAL ENDOSCOPY  10/05/2020    Capsule endoscopy 10/20: ? debris vs ulceration noted at 3:57 35sec entended capsule time in stomach and at ileocecal valve Capsule remained in small bowel at end of study    URETEROSCOPY  04/12/2021 2/21: Left, stent placement, laser lithotripsy     Social History     Social History Narrative    , 3 daughters    Works as  for [employer]    Nonsmoker    No ETOH    ---    Family History:    F: Diabetes, CAD/MI   "( 74)    M Kidney stones, hypotension, OA    S: Migraine, RA                             S:        B:                                   Aunts/cousin with rectal CA; Aunt other side of family has rectal CA    2Aunt:  Colon Cancer       Allergies and Medications  Penicillins, Tamsulosin, Tizanidine, and Latex  Current Outpatient Medications   Medication Instructions    alfuzosin (UROXATRAL) 10 mg, oral, Nightly, Do not crush, chew, or split.    cholecalciferol (VITAMIN D-3) 25 mcg, oral, Daily    diazePAM (VALIUM) 10 mg, oral, Every 8 hours PRN    ferrous sulfate 325 (65 Fe) MG tablet 65 mg of iron, oral, Weekly    levothyroxine (SYNTHROID, LEVOXYL) 125 mcg, oral, Daily before breakfast    methylPREDNISolone (Medrol Dospak) 4 mg tablets Take as directed on package.    multivitamin tablet 1 tablet, oral, Daily    pantoprazole (PROTONIX) 20 mg, oral, Daily before breakfast       ROS otherwise negative aside from what was mentioned above in HPI.    Vitals  /64   Pulse 82   Ht 1.676 m (5' 6\")   Wt 71.7 kg (158 lb)   BMI 25.50 kg/m²   Body mass index is 25.5 kg/m².  Physical Exam  Gen: Alert, NAD  Neuro:  Gross motor and sensory intact  Ext: RLE longer than left; Right great toe joint tender but not exquistely tender, no warmth or redness, decreased ROM  Back: Tender over left SIJ with + muscle knots notes around it    "

## 2023-12-11 ENCOUNTER — OFFICE VISIT (OUTPATIENT)
Dept: PODIATRY | Facility: CLINIC | Age: 58
End: 2023-12-11
Payer: COMMERCIAL

## 2023-12-11 DIAGNOSIS — M67.479 GANGLION CYST OF FOOT: ICD-10-CM

## 2023-12-11 DIAGNOSIS — M79.674 TOE PAIN, RIGHT: ICD-10-CM

## 2023-12-11 DIAGNOSIS — M79.672 PAIN IN BOTH FEET: Primary | ICD-10-CM

## 2023-12-11 DIAGNOSIS — M19.079 1ST MTP ARTHRITIS: ICD-10-CM

## 2023-12-11 DIAGNOSIS — M79.671 PAIN IN BOTH FEET: Primary | ICD-10-CM

## 2023-12-11 PROCEDURE — 99203 OFFICE O/P NEW LOW 30 MIN: CPT | Performed by: PODIATRIST

## 2023-12-11 PROCEDURE — 1036F TOBACCO NON-USER: CPT | Performed by: PODIATRIST

## 2023-12-11 NOTE — PROGRESS NOTES
History of Present Illness:   Patient states they are here for foot pain  Pain to right big toe joint  Has bump to left foot  Both can cause some pain  No improving treatments noted.   No DM  NO other pedal complaints at this time    Past Medical History  Past Medical History:   Diagnosis Date    H/O CT scan     4/22: CT Calcium score: 0 The visualized segments of the lungs demonstrates minimal bullous emphysematous changes. There is basilar atelectasis.. 10/20: CT enterography: HH, moderately enlarged prostate 4/7/21: CT urogram: NO HYDRONEPHROSIS OR NEPHROLITHIASIS.  NO ABNORMALLY ENHANCED RENAL OR BLADDER MASS.  PROSTATE HYPERTROPHY.    H/O CT scan of abdomen     12/18: cyst left kidney 7mm, small HH 1/21: IMPRESSION: 3 mm obstructing calculus at the left ureterovesical junction resulting in mild hydroureter and hydronephrosis. Perinephric and periureteral stranding is most likely related to the obstruction but infection is not excluded. Prostatomegaly.    H/O CT scan of brain     12/18: normal    H/O CT scan of chest     2016: Few scattered bilateral tiny nodular/groundglass opacities <4mm    H/O diagnostic ultrasound     2015 US kidney: Normal; prostate gland is enlarged indenting the base of the urinary bladder , Left ureteral stent    H/O magnetic resonance imaging     2/12: mri SHOULDER: 2. Mild tendinosis of the distal supraspinatus tendon without discrete tear. 3. Chronic degenerative tear of the superior labrum within limits of non arthrographic study.    H/O magnetic resonance imaging of cervical spine     2/21: There is loss of height and signal and intervertebral disc spaces throughout the cervical spine. There are associated small bulging discs, uncovertebral joint osteophytes and facet hypertrophy. There is no measurable canal stenosis, focal disc herniation, cord compression or nerve root compression    H/O magnetic resonance imaging of lumbar spine     2009: Disc protrusions on the left at L4-5  and L5-S1, with impingement . s/p PT and injections; Central and slightly left-sided disk protrusion L5/S1 2017: Multilevel discogenic and facet hypertrophic degenerative change as  described above. Left laminotomy at L4 and L5. Recurrent disc  protrusion versus granulation tissue/epidural scar at L4-L5 and contacts  the left L5 nerve root    History of PFTs     2015 (-)       Medications and Allergies have been reviewed.    Review Of Systems:  GENERAL: No weight loss, malaise or fevers.  HEENT: Negative for frequent or significant headaches,   RESPIRATORY: Negative for cough, wheezing or shortness of breath.  CARDIOVASCULAR: Negative for chest pain, leg swelling or palpitations.    Examination of Both Lower Extremities:   Objective:   Vasc: DP and PT pulses are palpable bilateral.  CFT is less than 3 seconds bilateral.  Skin temperature is warm to cool proximal to distal bilateral.      Neuro: Vibratory, light touch and proprioception are intact bilateral.      Derm: Nails 1-5 bilateral are intact.  Skin is supple with normal texture and turgor noted.  Webspaces are clean, dry and intact bilateral.  There are no hyperkeratoses, ulcerations, verruca or other lesions noted.  Dorsal bump to left lateral midfoot. No pain with palpation. Non mobile.    Ortho: Muscle strength is 5/5 for all pedal groups tested.  Pain to right 1st MPTJ with Rom. No edema or erythema noted    1. Pain in both feet        2. 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 Ganglion cyst of foot          Patient exam and eval  R 1st MTPJ pain with ROM.   Discussed stiff supportive shoe gear  Shoes that do not twist or bend  Discssed nsaids and topical biofreeze  Ganglion - discussed draining vs not draining  Deferred drain at this time  Will modify shoe gear  Fu prn  Patient was in agreement to this plan. All questions answered.      Nohemy Conroy,  HAYDEE  [phone]  [facility] [address]  Fax: [phone]

## 2024-03-19 ENCOUNTER — OFFICE VISIT (OUTPATIENT)
Dept: PRIMARY CARE | Facility: CLINIC | Age: 59
End: 2024-03-19
Payer: COMMERCIAL

## 2024-03-19 ENCOUNTER — TELEPHONE (OUTPATIENT)
Dept: PRIMARY CARE | Facility: CLINIC | Age: 59
End: 2024-03-19

## 2024-03-19 VITALS
DIASTOLIC BLOOD PRESSURE: 73 MMHG | WEIGHT: 151.25 LBS | HEART RATE: 64 BPM | BODY MASS INDEX: 23.74 KG/M2 | HEIGHT: 67 IN | OXYGEN SATURATION: 97 % | SYSTOLIC BLOOD PRESSURE: 113 MMHG | TEMPERATURE: 97.6 F

## 2024-03-19 DIAGNOSIS — M47.812 SPONDYLOSIS OF CERVICAL REGION WITHOUT MYELOPATHY OR RADICULOPATHY: ICD-10-CM

## 2024-03-19 DIAGNOSIS — G44.219 TTH (TENSION-TYPE HEADACHE), INFREQUENT EPISODIC TYPE: ICD-10-CM

## 2024-03-19 DIAGNOSIS — M54.81 BILATERAL OCCIPITAL NEURALGIA: Primary | ICD-10-CM

## 2024-03-19 PROBLEM — G43.909 MIGRAINE WITHOUT STATUS MIGRAINOSUS, NOT INTRACTABLE: Status: RESOLVED | Noted: 2023-03-19 | Resolved: 2024-03-19

## 2024-03-19 PROCEDURE — 1036F TOBACCO NON-USER: CPT | Performed by: INTERNAL MEDICINE

## 2024-03-19 PROCEDURE — 99214 OFFICE O/P EST MOD 30 MIN: CPT | Performed by: INTERNAL MEDICINE

## 2024-03-19 RX ORDER — METHOCARBAMOL 500 MG/1
500 TABLET, FILM COATED ORAL 3 TIMES DAILY PRN
COMMUNITY
Start: 2023-12-06 | End: 2024-03-19 | Stop reason: WASHOUT

## 2024-03-19 RX ORDER — GABAPENTIN 100 MG/1
100 CAPSULE ORAL 3 TIMES DAILY
Qty: 90 CAPSULE | Refills: 5 | Status: SHIPPED | OUTPATIENT
Start: 2024-03-19 | End: 2024-09-15

## 2024-03-19 RX ORDER — NORTRIPTYLINE HYDROCHLORIDE 10 MG/1
10 CAPSULE ORAL NIGHTLY
COMMUNITY
End: 2024-03-19 | Stop reason: WASHOUT

## 2024-03-19 RX ORDER — ALBUTEROL SULFATE 90 UG/1
1 AEROSOL, METERED RESPIRATORY (INHALATION) EVERY 4 HOURS PRN
COMMUNITY
Start: 2022-02-19 | End: 2024-03-19 | Stop reason: WASHOUT

## 2024-03-19 RX ORDER — DIAZEPAM 10 MG/1
20 TABLET ORAL EVERY 8 HOURS PRN
Qty: 42 TABLET | Refills: 0 | Status: SHIPPED | OUTPATIENT
Start: 2024-03-19 | End: 2024-03-19 | Stop reason: SDUPTHER

## 2024-03-19 RX ORDER — DIAZEPAM 10 MG/1
10 TABLET ORAL EVERY 6 HOURS PRN
Qty: 42 TABLET | Refills: 0 | Status: SHIPPED | OUTPATIENT
Start: 2024-03-19 | End: 2024-05-06

## 2024-03-19 RX ORDER — DOXAZOSIN 1 MG/1
1 TABLET ORAL DAILY
COMMUNITY
Start: 2023-07-23 | End: 2024-03-19 | Stop reason: WASHOUT

## 2024-03-19 ASSESSMENT — PATIENT HEALTH QUESTIONNAIRE - PHQ9
2. FEELING DOWN, DEPRESSED OR HOPELESS: NOT AT ALL
SUM OF ALL RESPONSES TO PHQ9 QUESTIONS 1 AND 2: 0
1. LITTLE INTEREST OR PLEASURE IN DOING THINGS: NOT AT ALL

## 2024-03-19 NOTE — TELEPHONE ENCOUNTER
He takes 1/2 doses prn so he never takes 20mg at one time  So can change sig to 10mg every 6 hours instead of 10mg every 12 hours if that's better for them  Reformat if needed

## 2024-03-19 NOTE — PROGRESS NOTES
CC/HPI:   Headache (Headaches are almost daily./OTC seem to longer work.).  HA are worsening  Tried Advil, Tylenol , ineffective  Muscle is so tense in side of the neck  HA runs up the back of head to the top  using Valium, helps but knocks him out and cannot function  Same with opiates  Tried PT  Tried Chiro  Had steroid trigger point injection, helped for a week  DiD acupuncture, ineffective  Used TENS and doesnt help anymore  Pain is sharp pain, unilateral  Can be either side  Left usually    MRI cervical spine 2021: Cervical spondylosis without canal or foraminal narrowing.     Saw Dr Brito at [facility] ([facility])- copied  Patient complaining of severe tightness in shoulders and neck that has progressed over the last 20 years. No obvious etiology is noted. No spasticity or dystonia is noted on examination. Stiffness and pain appear to be muscular in etiology. He would benefit from having an evaluation by Roselia Guy MD, [facility] regarding possible trigger point injections. I do not believe he is appropriate for botulinum toxin therapy at this point. He is to continue diazepam as previously prescribed. He is encouraged to continue to perform stretching exercises. He will discuss physical therapy with Dr. Guy. He will follow up with me as needed.     Saw Neuro F in 12/23 (Copied)  ASSESSMENT:  This is Elmo Yap is a 57 year old male with a history of hypertension who presents with headaches.    1.  Chronic tension headache  PLAN:   Start preventative namely nortriptyline 10 mg at bedtime.  Robaxin as directed  Any problems or concerns to call me or primary care physician immediately or go straight to the emergency department     Tried it but caised high BP and HR  Assessment and Plan:  Problem List Items Addressed This Visit          Medium    Spondylosis of cervical region without myelopathy or radiculopathy    Overview     2/21 MRI: There is loss of height and signal and intervertebral disc spaces throughout  "the cervical spine. There are associated small bulging discs, uncovertebral joint osteophytes and facet hypertrophy. There is no measurable canal stenosis, focal disc herniation, cord compression or nerve root compression  Uses Valium prn cervicogenic headaches         Relevant Medications    diazePAM (Valium) 10 mg tablet    TTH (tension-type headache), infrequent episodic type    Overview     Tension and migraine.   Valium daily prn          Other Visit Diagnoses       Bilateral occipital neuralgia    -  Primary    r/o compressive lesion  MRI brain  Trial gabapentin, uptitrate as able  Pain med consult for possible occipital nerve block    Relevant Medications    gabapentin (Neurontin) 100 mg capsule    Other Relevant Orders    Referral to Pain Medicine            ROS otherwise negative aside from what was mentioned above in HPI.    Vitals  /73   Pulse 64   Temp 36.4 °C (97.6 °F)   Ht 1.702 m (5' 7\")   Wt 68.6 kg (151 lb 4 oz)   SpO2 97%   BMI 23.69 kg/m²   Body mass index is 23.69 kg/m².  Physical Exam  Gen: Alert, NAD  HEENT: Unremarkable, PERRLA  Neck; Site of pain in the occipital insertion site for the trap muscle, minimally tight cervical muscles  Respiratory:  Lungs CTAB  CV: RRR  Neuro:  Gross motor and sensory intact, mild twitch of left side of mouth      Allergies and Medications  Penicillins, Pamelor [nortriptyline], Tamsulosin, Tizanidine, Vicodin [hydrocodone-acetaminophen], and Latex  Current Outpatient Medications   Medication Instructions    cholecalciferol (VITAMIN D-3) 25 mcg, oral, Daily    diazePAM (VALIUM) 20 mg, oral, Every 8 hours PRN    ferrous sulfate 325 (65 Fe) MG tablet 65 mg of iron, oral, Weekly    gabapentin (NEURONTIN) 100 mg, oral, 3 times daily    levothyroxine (SYNTHROID, LEVOXYL) 125 mcg, oral, Daily before breakfast    multivitamin tablet 1 tablet, oral, Daily    pantoprazole (PROTONIX) 20 mg, oral, Daily before breakfast         "

## 2024-03-19 NOTE — TELEPHONE ENCOUNTER
[facility] calling to clarify rx for valium wants to know if you want that sig as that is a lot   Please advise

## 2024-03-20 ENCOUNTER — HOSPITAL ENCOUNTER (OUTPATIENT)
Dept: RADIOLOGY | Facility: CLINIC | Age: 59
Discharge: HOME | End: 2024-03-20
Payer: COMMERCIAL

## 2024-03-20 DIAGNOSIS — M54.81 BILATERAL OCCIPITAL NEURALGIA: ICD-10-CM

## 2024-03-20 PROCEDURE — 70551 MRI BRAIN STEM W/O DYE: CPT | Performed by: RADIOLOGY

## 2024-03-20 PROCEDURE — 70551 MRI BRAIN STEM W/O DYE: CPT

## 2024-04-08 ENCOUNTER — OFFICE VISIT (OUTPATIENT)
Dept: PAIN MEDICINE | Facility: CLINIC | Age: 59
End: 2024-04-08
Payer: COMMERCIAL

## 2024-04-08 ENCOUNTER — HOSPITAL ENCOUNTER (OUTPATIENT)
Dept: RADIOLOGY | Facility: HOSPITAL | Age: 59
Discharge: HOME | End: 2024-04-08
Payer: COMMERCIAL

## 2024-04-08 VITALS
TEMPERATURE: 96.6 F | RESPIRATION RATE: 18 BRPM | DIASTOLIC BLOOD PRESSURE: 88 MMHG | OXYGEN SATURATION: 98 % | HEART RATE: 86 BPM | SYSTOLIC BLOOD PRESSURE: 139 MMHG

## 2024-04-08 DIAGNOSIS — M47.812 CERVICAL SPONDYLOSIS WITHOUT MYELOPATHY: Primary | ICD-10-CM

## 2024-04-08 DIAGNOSIS — M54.81 BILATERAL OCCIPITAL NEURALGIA: ICD-10-CM

## 2024-04-08 DIAGNOSIS — M47.812 CERVICAL SPONDYLOSIS WITHOUT MYELOPATHY: ICD-10-CM

## 2024-04-08 PROCEDURE — 72040 X-RAY EXAM NECK SPINE 2-3 VW: CPT

## 2024-04-08 PROCEDURE — 1036F TOBACCO NON-USER: CPT | Performed by: PAIN MEDICINE

## 2024-04-08 PROCEDURE — 72040 X-RAY EXAM NECK SPINE 2-3 VW: CPT | Performed by: RADIOLOGY

## 2024-04-08 PROCEDURE — 99204 OFFICE O/P NEW MOD 45 MIN: CPT | Performed by: PAIN MEDICINE

## 2024-04-08 PROCEDURE — 99214 OFFICE O/P EST MOD 30 MIN: CPT | Performed by: PAIN MEDICINE

## 2024-04-08 ASSESSMENT — PAIN SCALES - GENERAL: PAINLEVEL_OUTOF10: 3

## 2024-04-08 ASSESSMENT — PAIN - FUNCTIONAL ASSESSMENT: PAIN_FUNCTIONAL_ASSESSMENT: 0-10

## 2024-04-08 NOTE — PROGRESS NOTES
20 plus years has left side neck pain that goes up around to top of head  Had utilized gabapentin but did not notice much improvement  Does not recall any injections in the last 12 months

## 2024-04-08 NOTE — H&P
History Of Present Illness  Elmo Yap is a 58 y.o. male presenting with  20 plus years has left side neck pain that goes up around to top of head  Had utilized gabapentin but did not notice much improvement  Does not recall any injections in the last 12 months   Was tried on physical therapy including acupuncture and TENS unit but that did not provide him with any significant improvement currently his pain is mainly localized on the left side of his neck radiating up towards the head.  Was tried on Tylenol Advil Excedrin and Aleve without any significant improvement multiple medications were tried and they affected the blood pressure currently using on as needed basis the diazepam that he describes providing him with a significant muscle relaxation but makes him also feel sleepy.    Pain Assessment as of today    Pain Assessment   Pain Assessment 0-10   Pain Score 3   Pain Type Chronic pain   Pain Location Neck   Pain Orientation Left        Past Medical History  Past Medical History:   Diagnosis Date    H/O CT scan     4/22: CT Calcium score: 0 The visualized segments of the lungs demonstrates minimal bullous emphysematous changes. There is basilar atelectasis.. 10/20: CT enterography: HH, moderately enlarged prostate 4/7/21: CT urogram: NO HYDRONEPHROSIS OR NEPHROLITHIASIS.  NO ABNORMALLY ENHANCED RENAL OR BLADDER MASS.  PROSTATE HYPERTROPHY.    H/O CT scan of abdomen     12/18: cyst left kidney 7mm, small HH 1/21: IMPRESSION: 3 mm obstructing calculus at the left ureterovesical junction resulting in mild hydroureter and hydronephrosis. Perinephric and periureteral stranding is most likely related to the obstruction but infection is not excluded. Prostatomegaly.    H/O CT scan of brain     12/18: normal    H/O CT scan of chest     2016: Few scattered bilateral tiny nodular/groundglass opacities <4mm    H/O diagnostic ultrasound     2015 US kidney: Normal; prostate gland is enlarged indenting the base of the  urinary bladder , Left ureteral stent    H/O magnetic resonance imaging     2/12: mri SHOULDER: 2. Mild tendinosis of the distal supraspinatus tendon without discrete tear. 3. Chronic degenerative tear of the superior labrum within limits of non arthrographic study.    H/O magnetic resonance imaging of brain and brain stem 03/20/2024    Sequela of mild chronic small vessel ischemic changes, otherwise unremarkable MRI brain.    H/O magnetic resonance imaging of cervical spine     2/21: There is loss of height and signal and intervertebral disc spaces throughout the cervical spine. There are associated small bulging discs, uncovertebral joint osteophytes and facet hypertrophy. There is no measurable canal stenosis, focal disc herniation, cord compression or nerve root compression    H/O magnetic resonance imaging of lumbar spine     2009: Disc protrusions on the left at L4-5 and L5-S1, with impingement . s/p PT and injections; Central and slightly left-sided disk protrusion L5/S1 2017: Multilevel discogenic and facet hypertrophic degenerative change as  described above. Left laminotomy at L4 and L5. Recurrent disc  protrusion versus granulation tissue/epidural scar at L4-L5 and contacts  the left L5 nerve root    History of PFTs     2015 (-)       Surgical History  Past Surgical History:   Procedure Laterality Date    BACK SURGERY  03/24/2020 7/19: revision decompression L5-S1 with fusion    BLADDER SURGERY  12/07/2018    Bladder CA removal 1/21 found incidentally on ureteroscopy low grade noninvasive urothelial cell carcinoma of the bladder    COLONOSCOPY  08/05/2020    5/15: Three polyps were found in the rectum.  measuring 3-5 mm; hyperplastic polyps; polyps (5 years) 8/2020: ileium normal; colon normal; no specimens; 5 y    CYSTOSCOPY  04/04/2022 4/23: Normal ;4/21: Normal ([facility]) 4/22: Normal aside from Prostate: Moderate lateral lobes with prolapse    ESOPHAGOGASTRODUODENOSCOPY  08/05/2020    2005: SMALL  HIATAL HERNIA, CHRONIC GASTRITIS, negative for H Pylori  : normal 2020: small hiatal hernia    LUMBAR LAMINECTOMY  2019: 1. revision L4-S1 leminectomy. 2. Vila/radical decompression L5/S1, excision recurrent HNP. 3. PSF w instrumentation L5/S1. 4. TLIF w cage L5/S1    OTHER SURGICAL HISTORY  2018    Tonsillectomy    PROSTATE BIOPSY  2020: FINAL DIAGNOSIS  1. Prostate, left base, biopsy (A) - Benign prostate tissue.    THYROIDECTOMY  2021    Bladder surgery    UPPER GASTROINTESTINAL ENDOSCOPY  10/05/2020    Capsule endoscopy 10/20: ? debris vs ulceration noted at 3:57 35sec entended capsule time in stomach and at ileocecal valve Capsule remained in small bowel at end of study    URETEROSCOPY  2021: Left, stent placement, laser lithotripsy        Social History  He reports that he has never smoked. He has never used smokeless tobacco. He reports that he does not currently use alcohol. He reports that he does not use drugs.    Family History  Family History   Problem Relation Name Age of Onset    No Known Problems Mother          F: Diabetes, CAD/MI  ( 74) M Kidney stones, hypotension, OA S: Migraine, RA                          S:     B:                                Aunts/cousin with rectal CA; Aunt other side of family has rectal CA 2Aunt:  Colon Cancer        Allergies  Penicillins, Pamelor [nortriptyline], Tamsulosin, Tizanidine, Vicodin [hydrocodone-acetaminophen], and Latex    Review of Systems   12 Systems have been reviewed as follows.   Constitutional: Fever, weight gain, weight loss, appetite change, night sweats, fatigue, chills.  Eyes : blurry, double vision, vision, loss, tearing, redness, pain, sensitivity to light, glaucoma.  Ears, nose, mouth, and throat: Hearing loss, ringing in the ears, ear pain, nasal congestion, nasal drainage, nosebleeds, mouth, throat, irritation tooth problem.  Cardiovascular :chest pain, pressure, heart  tracing,palpaitations , sweating, leg swelling, high or low blood pressure  Pulmonary: Cough, yellow or green sputum, blood and sputum, shortness of breath, wheezing  Gastrointestinal: Nause, vomiting, diarrhea, constipation, pain, blood in stool, or vomitus, heartburn, difficulty swallowing  Genitourinary: incontinence, abnormal bleeding, abnormal discharge, urinary frequency, urinary hesitancy, pain, impotence sexual problem, infection, urinary retention  Musculoskeletal: Pain, stiffness, joint, redness or warmth, arthritis, back pain, weakness, muscle wasting, sprain or fracture  Neuro: Weight weakness, dizziness, change in voice, change in taste change in vision, change in hearing, loss, or change of sensation, trouble walking, balance problems coordination problems, shaking, speech problem  Endocrine , cold or heat intolerance, blood sugar problem, weight gain or loss missed periods hot flashes, sweats, change in body hair, change in libido, increased thirst, increased urination  Heme/lymph: Swelling, bleeding, problem anemia, bruising, enlarged lymph nodes  Allergic/immunologic: H. plus nasal drip, watery itchy eyes, nasal drainage, immunosuppressed  The above, were reviewed and noted negative except as noted.    Physical Exam   Vital signs reviewed, documented in chart     General:  Appears well, does not look in any major distress  Alert    HEENT:  Head atraumatic  Eyes normal inspection  PERRL  Normal ENT inspection  No signs of dehydration    NECK:  Normal inspection  Range of motion within normal provocative of pain on the rotation and flexion extension with some minimal decrease in the range of motion  Palpation of the cervical facet created tenderness around the left side  The left occipital nerve site is tender to palpation    RESPIRATORY:  No respiratory distress    CVS:  Heart rate and rhythm regular    ABDOMEN/GI  Soft  Non-tender  No distention  No organomegaly          EXTREMITIES:  Non-Tender  Full ROM  Normal appearance  No Pedal edema  Power symmetrical , sensory examination preserved.    NEURO:  Alert and oriented X 3  CNS normal as tested without focal neurological deficit   Sensation normal  Motor normal  reflexes normal    PSYCH:  Mood normal  Affect normal    SKIN:  Color normal  No rash  Warm  Dry  no sign of skin marking supportive of IV drug usage /abuse.    Last Recorded Vitals  Blood pressure 139/88, pulse 86, temperature 35.9 °C (96.6 °F), resp. rate 18, SpO2 98 %.    Relevant Results      cervical spine. .     FINDINGS:  The craniovertebral junction is normal. Marrow signal and vertebral  body height are normal. The cord is normal in size and signal. There  is loss of height and signal and intervertebral disc spaces  throughout the cervical spine. There are associated small bulging  discs, uncovertebral joint osteophytes and facet hypertrophy. There  is no measurable canal stenosis, focal disc herniation, cord  compression or nerve root compression. The soft tissues of the neck  are unremarkable.     IMPRESSION:  Cervical spondylosis without canal or foraminal narrowing.     [place] [facility] [place]arrative & Impression   Interpreted By:  Aric Jean,   STUDY:  MR BRAIN WO IV CONTRAST;  3/20/2024 9:13 am      INDICATION:  Signs/Symptoms:r/o lesion causing left sided occipital neuralgia pain  and twitching of mouth left side.      COMPARISON:  CT head from 12/17/2018.      ACCESSION NUMBER(S):  [id number]      ORDERING CLINICIAN:  ALLISON THORNTON      TECHNIQUE:  Axial T2, FLAIR, DWI, gradient echo T2 and sagittal and coronal T1  weighted images of brain were acquired.      FINDINGS:  CSF Spaces: The ventricles, sulci and basal cisterns are within  normal limits. There is no abnormal extra-axial fluid collection.      Parenchyma: There is no diffusion restriction abnormality to suggest  acute infarct.  There are few scattered foci of T2  hyperintensity  within the cerebral hemispheric white matter which are nonspecific  and are probably sequela of mild chronic small vessel ischemic  changes. There is no mass effect or midline shift.      Paranasal Sinuses and Mastoids: There is mild mucosal thickening  located within the floor of the maxillary sinuses and within the  ethmoid air cells. The mastoid air cells are clear.      IMPRESSION:  Sequela of mild chronic small vessel ischemic changes, otherwise  unremarkable MRI brain.      This study was interpreted at [facility].      MACRO:  None        Assessment/Plan     58 years old with history and physical examination supportive of cervical spondylosis cervicalgia tried and failed conservative management with physical therapy and nonsteroidal anti-inflammatory with the positive finding on imaging studies       Plan  Discussed with the patient the different modalities available for the treatment of his condition I would be obtaining an x-ray of the cervical spine area to check on the condition of his cervical osteophyte and degenerative disc disease  I recommend for the patient a diagnostic medial nerve branch block to be performed under fluoroscopic guidance targeting the left C2-C3 C3-C4 performed under x-ray guidance to confirm needle placement if the patient described positive response at that time could proceed with the denervation with a radiofrequency ablation of the left C2-C3 C3-C4 I will reevaluate the patient after the performance of the block for further recommendation as his case progresses benefits and risk were discussed and he would like to proceed      The above clinical summary has been dictated with voice recognition software. It has not been proofread for grammatical errors, typographical mistakes, or other semantic inconsistencies.    Thank you for visiting our office today. It was our pleasure to take part in your healthcare.     Please do not  hesitate to contact the pain clinic after your visit with any questions or concerns at  M-F 8-4 pm       Shannon Mills M.D.  Medical Director , Division of Pain Medicine [facility]   of Anesthesiology and Pain Medicine  [facility]     [facility]  [id number] [place] [address]     Office: [phone]  Fax: [phone]       Shannon Mills MD

## 2024-04-25 ENCOUNTER — LAB REQUISITION (OUTPATIENT)
Dept: LAB | Facility: HOSPITAL | Age: 59
End: 2024-04-25
Payer: COMMERCIAL

## 2024-04-25 DIAGNOSIS — N39.0 URINARY TRACT INFECTION, SITE NOT SPECIFIED: ICD-10-CM

## 2024-04-25 PROCEDURE — 87086 URINE CULTURE/COLONY COUNT: CPT

## 2024-04-27 LAB — BACTERIA UR CULT: ABNORMAL

## 2024-05-04 NOTE — PROGRESS NOTES
ANNUAL CPE VISIT  Chief Complaint   Patient presents with    Annual Exam     HPI: Gabapentin did help his neck   Also had TP injections that helped  Saw PM&R specialist at [facility] 4/15/24 and s/p trigger point injections Bilateral upper trapezius and left levator scapula  MRI done 3/24:  Sequela of mild chronic small vessel ischemic changes, otherwise unremarkable MRI brain.  XR cervical spine done 4/9/24: Degenerative changes include disc space narrowing, endplate spurring,  endplate sclerosis, uncovertebral spurring, posterior disc osteophyte complexes, and facet hypertrophy. There is mild subluxation at several levels, most likely related to facet degenerative disease.  Degenerative changes predominantly involve the C4-C5 and C5-C6 Levels    S/p pain mgmt appt 4/8/24:  Assessment/Plan   58 years old with history and physical examination supportive of cervical spondylosis cervicalgia tried and failed conservative management with physical therapy and nonsteroidal anti-inflammatory with the positive finding on imaging studies  Plan  Discussed with the patient the different modalities available for the treatment of his condition I would be obtaining an x-ray of the cervical spine area to check on the condition of his cervical osteophyte and degenerative disc disease  I recommend for the patient a diagnostic medial nerve branch block to be performed under fluoroscopic guidance targeting the left C2-C3 C3-C4 performed under x-ray guidance to confirm needle placement if the patient described positive response at that time could proceed with the denervation with a radiofrequency ablation of the left C2-C3 C3-C4 I will reevaluate the patient after the performance of the block for further recommendation as his case progresses benefits and risk were discussed and he would like to proceed    S/p cystoscopy 4/12/24 , normal  prostate mod lateral lobes; TUR defect. trabeculation mild, inflammation mild. MARI 60 g no nodules     Was  in UC with UTI/parainfluenza  Treated w/Bactrim  Fine now  20,000 - 80,000 Streptococcus mitis/oralis group Abnormal      Has lost 4 lbs since 3/24  Didnt stop or start any meds  Pain is better  More active  Also doing more lawncare    Assessment and Plan:  Problem List Items Addressed This Visit          High    Routine adult health maintenance - Primary (Chronic)    Overview     TDaP 3/7/18  COVID-19 Moderna 3/10/21, 4/15/21, 11/18/21, 4/25/22, 12/9/22  FLu vaccine 2018, 10/1/19, 10/1/20, 10/1/22  Cscope 2015 (5yrs); 8/2020 (5 y)  6/29/20, PSA 3.9, 3.58 9/3/18; 1.56 4/9/21, 1.79 4/1/22 ; 4/21/23 3.6         Current Assessment & Plan     Annual Wellness exam completed   Preventive Health History reviewed  Ordered:   Labs           Relevant Orders    Comprehensive Metabolic Panel    CBC and Auto Differential    Lipid Panel    Urinalysis with Reflex Culture and Microscopic       Medium    BPH (benign prostatic hyperplasia)    Overview     with elevated PSA  Has seen Urology at [facility]  On Flomax in past(dizziness, ED) until changed to Uroxatral in 10/21  9/18:  Prostate, left base, biopsy (A) - Benign prostate tissue.  S/p green light laser July 2023 (complicated by infection)         Relevant Orders    PSA, Total and Free    Chronic GERD    Overview     On PPI         History of bladder cancer    Overview     1/21: NONINVASIVE PAPILLARY UROTHELIAL CARCINOMA, LOW GRADE  s/p removal  4/21 CT Urogram and CYstoscopy normal  Urine Cytolgy 4/22/24: Negative for high-grade urothelial carcinoma.   Managed by Urology [facility]  Annual Cystoscopy needed: last 4/12/24 (-)         History of thyroid cancer    Overview     Papillary carcinoma - treated with surgery and radioactive iodine therapy  TSH should be 0.1 or higher per Endo (Epic message 5/4/23)         Relevant Orders    TSH with reflex to Free T4 if abnormal    Hypothyroidism, adult    Overview     post thyroidectomy from papillary CA.   TSH needs to be <0.04  on synthroid          Relevant Orders    TSH with reflex to Free T4 if abnormal    Iron deficiency    Overview     on PO supp  continue         Kidney stones    Overview     2/21: s/p 2/21: Left ureteroscopy, stent placement, laser lithotripsy  24hr urine showed inadequate volume, hypercalciuria and excess sodium  Rec'd to increase hydration, decrease sodum intake and increase citric acid intake  Urology manages, Dr Phoenix  GOal Vit D 30-40, VUt D decreased from 2000 IU to 1000IU daily         Relevant Orders    Vitamin D 25-Hydroxy,Total (for eval of Vitamin D levels)    Medication management    Overview     UDS, signed controlled substance contract, and OARRS check all up to date           Spondylosis of cervical region without myelopathy or radiculopathy    Overview     2/21 MRI: There is loss of height and signal and intervertebral disc spaces throughout the cervical spine. There are associated small bulging discs, uncovertebral joint osteophytes and facet hypertrophy. There is no measurable canal stenosis, focal disc herniation, cord compression or nerve root compression   XR 4/24: Degenerative changes include disc space narrowing, endplate spurring, endplate sclerosis, uncovertebral spurring, posterior disc osteophyte complexes, and facet hypertrophy.   There is mild subluxation at several levels, most likely related to facet degenerative disease.  Degenerative changes predominantly involve the C4-C5 and C5-C6 Levels  S/p Pain Med consult 4/8/24: Plan for diagnostic medial nerve branch block to be performed under fluoroscopic guidance targeting the left C2-C3 C3-C4 performed under x-ray guidance to confirm needle placement if the patient described positive response at that time could proceed with the denervation with a radiofrequency ablation of the left C2-C3 C3-C4   Saw PM&R specialist at [facility] 4/15/24 and s/p trigger point injections Bilateral upper trapezius and left levator scapula  Uses Valium prn cervicogenic headaches    "      Vitamin D deficiency    Overview     Goal 30-40 given hx kidney stones  Supratherapeutic on 5K daily          Other Visit Diagnoses       Weight loss        will get CXR and labs  Culd be due to more activity and less pain  if doesnt stabliize would do CT C/A/P    Relevant Orders    XR chest 2 views    Lactate dehydrogenase    Sedimentation Rate              ROS otherwise negative aside from what was mentioned above in HPI.    Vitals  /66   Pulse 73   Temp 35.8 °C (96.5 °F)   Ht 1.715 m (5' 7.5\")   Wt 66.7 kg (147 lb)   SpO2 95%   BMI 22.68 kg/m²   Body mass index is 22.68 kg/m².  Physical Exam  Gen: Alert, NAD  HEENT:  Normal exam  Neck:  No masses/nodes palpable; Thyroid nontender and without nodules; No EVELIO  Respiratory:  Lungs CTAB  CV: RRR  Neuro:  Gross motor and sensory intact  Skin:  No suspicious lesions present  Breast: No masses or axillary lymphadenopathy  MARI not done as just saw urology    Allergies and Medications  Penicillins, Pamelor [nortriptyline], Tamsulosin, Tizanidine, Vicodin [hydrocodone-acetaminophen], and Latex  Current Outpatient Medications   Medication Instructions    cholecalciferol (VITAMIN D-3) 25 mcg, oral, Daily    diazePAM (VALIUM) 10 mg, oral, Every 6 hours PRN    ferrous sulfate 325 (65 Fe) MG tablet 65 mg of iron, oral, Once Weekly    gabapentin (NEURONTIN) 100 mg, oral, 3 times daily    levothyroxine (SYNTHROID, LEVOXYL) 125 mcg, oral, Daily before breakfast    multivitamin tablet 1 tablet, oral, Daily    pantoprazole (PROTONIX) 20 mg, oral, Daily before breakfast       Active Problem List  Patient Active Problem List   Diagnosis    BPH (benign prostatic hyperplasia)    Chronic GERD    Gilbert syndrome    H/O Helicobacter infection    Hiatal hernia    History of bladder cancer    History of colon polyps    History of thyroid cancer    Hypothyroidism, adult    Iron deficiency    Kidney stones    Localized primary osteoarthritis of carpometacarpal joint of left " thumb    Lumbar disc disease    Lumbar spondylosis    Spinal stenosis of lumbar region    Spondylolisthesis, lumbar region    Spondylosis of cervical region without myelopathy or radiculopathy    TTH (tension-type headache), infrequent episodic type    Vitamin D deficiency    Routine adult health maintenance    Medication management    Vocal cord dysfunction    Leukopenia       Comprehensive Medical/Surgical/Social/Family History  Past Medical History:   Diagnosis Date    H/O cervical spine x-ray 04/09/2024    Degenerative changes include disc space narrowing, endplate spurring, endplate sclerosis, uncovertebral spurring, posterior disc osteophyte complexes, and facet hypertrophy. There is mild subluxation at several levels, most likely related to facet degenerative disease. Degenerative changes predominantly involve the C4-C5 and C5-C6 levels    H/O CT scan     4/22: CT Calcium score: 0 The visualized segments of the lungs demonstrates minimal bullous emphysematous changes. There is basilar atelectasis.. 10/20: CT enterography: HH, moderately enlarged prostate 4/7/21: CT urogram: NO HYDRONEPHROSIS OR NEPHROLITHIASIS.  NO ABNORMALLY ENHANCED RENAL OR BLADDER MASS.  PROSTATE HYPERTROPHY.    H/O CT scan of abdomen     12/18: cyst left kidney 7mm, small HH 1/21: IMPRESSION: 3 mm obstructing calculus at the left ureterovesical junction resulting in mild hydroureter and hydronephrosis. Perinephric and periureteral stranding is most likely related to the obstruction but infection is not excluded. Prostatomegaly.    H/O CT scan of brain     12/18: normal    H/O CT scan of chest     2016: Few scattered bilateral tiny nodular/groundglass opacities <4mm    H/O diagnostic ultrasound     2015 US kidney: Normal; prostate gland is enlarged indenting the base of the urinary bladder , Left ureteral stent    H/O magnetic resonance imaging     2/12: mri SHOULDER: 2. Mild tendinosis of the distal supraspinatus tendon without discrete  tear. 3. Chronic degenerative tear of the superior labrum within limits of non arthrographic study.    H/O magnetic resonance imaging of brain and brain stem 03/20/2024    Sequela of mild chronic small vessel ischemic changes, otherwise unremarkable MRI brain.    H/O magnetic resonance imaging of cervical spine     2/21: There is loss of height and signal and intervertebral disc spaces throughout the cervical spine. There are associated small bulging discs, uncovertebral joint osteophytes and facet hypertrophy. There is no measurable canal stenosis, focal disc herniation, cord compression or nerve root compression    H/O magnetic resonance imaging of lumbar spine     2009: Disc protrusions on the left at L4-5 and L5-S1, with impingement . s/p PT and injections; Central and slightly left-sided disk protrusion L5/S1 2017: Multilevel discogenic and facet hypertrophic degenerative change as  described above. Left laminotomy at L4 and L5. Recurrent disc  protrusion versus granulation tissue/epidural scar at L4-L5 and contacts  the left L5 nerve root    History of PFTs     2015 (-)     Past Surgical History:   Procedure Laterality Date    BACK SURGERY  03/24/2020 7/19: revision decompression L5-S1 with fusion    BLADDER SURGERY  12/07/2018    Bladder CA removal 1/21 found incidentally on ureteroscopy low grade noninvasive urothelial cell carcinoma of the bladder    COLONOSCOPY  08/05/2020    5/15: Three polyps were found in the rectum.  measuring 3-5 mm; hyperplastic polyps; polyps (5 years) 8/2020: ileium normal; colon normal; no specimens; 5 y    CYSTOSCOPY  04/04/2022 4/23: Normal ;4/21: Normal ([facility]) 4/22: Normal aside from Prostate: Moderate lateral lobes with prolapse    CYSTOSCOPY  04/12/2024    prostate mod lateral lobes; TUR defect. trabeculation mild, inflammation mild. MARI 60 g no nodules    ESOPHAGOGASTRODUODENOSCOPY  08/05/2020    2005: SMALL HIATAL HERNIA, CHRONIC GASTRITIS, negative for H Pylori 2012 :  normal 2020: small hiatal hernia    LUMBAR LAMINECTOMY  2019: 1. revision L4-S1 leminectomy. 2. Vila/radical decompression L5/S1, excision recurrent HNP. 3. PSF w instrumentation L5/S1. 4. TLIF w cage L5/S1    OTHER SURGICAL HISTORY  2018    Tonsillectomy    PROSTATE BIOPSY  2020: FINAL DIAGNOSIS  1. Prostate, left base, biopsy (A) - Benign prostate tissue.    THYROIDECTOMY  2021    Bladder surgery    UPPER GASTROINTESTINAL ENDOSCOPY  10/05/2020    Capsule endoscopy 10/20: ? debris vs ulceration noted at 3:57 35sec entended capsule time in stomach and at ileocecal valve Capsule remained in small bowel at end of study    URETEROSCOPY  2021: Left, stent placement, laser lithotripsy       Social History     Social History Narrative    Social History:    , 3 daughters    Works as  for [employer]    Nonsmoker    No ETOH    ---    Family History:    F: Diabetes, CAD/MI  ( 74)    M Kidney stones, hypotension, OA    S: Migraine, RA                             S:        B:                                   Aunts/cousin with rectal CA; Aunt other side of family has rectal CA    2Aunt:  Colon Cancer

## 2024-05-06 ENCOUNTER — LAB (OUTPATIENT)
Dept: LAB | Facility: LAB | Age: 59
End: 2024-05-06
Payer: COMMERCIAL

## 2024-05-06 ENCOUNTER — OFFICE VISIT (OUTPATIENT)
Dept: PRIMARY CARE | Facility: CLINIC | Age: 59
End: 2024-05-06
Payer: COMMERCIAL

## 2024-05-06 ENCOUNTER — HOSPITAL ENCOUNTER (OUTPATIENT)
Dept: RADIOLOGY | Facility: CLINIC | Age: 59
Discharge: HOME | End: 2024-05-06
Payer: COMMERCIAL

## 2024-05-06 VITALS
SYSTOLIC BLOOD PRESSURE: 110 MMHG | WEIGHT: 147 LBS | HEART RATE: 73 BPM | BODY MASS INDEX: 22.28 KG/M2 | TEMPERATURE: 96.5 F | HEIGHT: 68 IN | DIASTOLIC BLOOD PRESSURE: 66 MMHG | OXYGEN SATURATION: 95 %

## 2024-05-06 DIAGNOSIS — Z00.00 ROUTINE ADULT HEALTH MAINTENANCE: Primary | Chronic | ICD-10-CM

## 2024-05-06 DIAGNOSIS — R80.9 PROTEINURIA, UNSPECIFIED TYPE: Primary | ICD-10-CM

## 2024-05-06 DIAGNOSIS — E03.9 HYPOTHYROIDISM, ADULT: ICD-10-CM

## 2024-05-06 DIAGNOSIS — Z85.850 HISTORY OF THYROID CANCER: ICD-10-CM

## 2024-05-06 DIAGNOSIS — N40.0 BENIGN PROSTATIC HYPERPLASIA, UNSPECIFIED WHETHER LOWER URINARY TRACT SYMPTOMS PRESENT: ICD-10-CM

## 2024-05-06 DIAGNOSIS — R63.4 WEIGHT LOSS: ICD-10-CM

## 2024-05-06 DIAGNOSIS — Z79.899 MEDICATION MANAGEMENT: ICD-10-CM

## 2024-05-06 DIAGNOSIS — Z00.00 ROUTINE ADULT HEALTH MAINTENANCE: ICD-10-CM

## 2024-05-06 DIAGNOSIS — E55.9 VITAMIN D DEFICIENCY: ICD-10-CM

## 2024-05-06 DIAGNOSIS — K21.9 CHRONIC GERD: ICD-10-CM

## 2024-05-06 DIAGNOSIS — M47.812 SPONDYLOSIS OF CERVICAL REGION WITHOUT MYELOPATHY OR RADICULOPATHY: ICD-10-CM

## 2024-05-06 DIAGNOSIS — E61.1 IRON DEFICIENCY: ICD-10-CM

## 2024-05-06 DIAGNOSIS — N20.0 KIDNEY STONES: ICD-10-CM

## 2024-05-06 DIAGNOSIS — Z85.51 HISTORY OF BLADDER CANCER: ICD-10-CM

## 2024-05-06 LAB
25(OH)D3 SERPL-MCNC: 27 NG/ML (ref 30–100)
ALBUMIN SERPL BCP-MCNC: 4.3 G/DL (ref 3.4–5)
ALP SERPL-CCNC: 90 U/L (ref 33–120)
ALT SERPL W P-5'-P-CCNC: 32 U/L (ref 10–52)
ANION GAP SERPL CALC-SCNC: 7 MMOL/L (ref 10–20)
APPEARANCE UR: ABNORMAL
AST SERPL W P-5'-P-CCNC: 22 U/L (ref 9–39)
BASOPHILS # BLD AUTO: 0.02 X10*3/UL (ref 0–0.1)
BASOPHILS NFR BLD AUTO: 0.7 %
BILIRUB SERPL-MCNC: 0.9 MG/DL (ref 0–1.2)
BILIRUB UR STRIP.AUTO-MCNC: NEGATIVE MG/DL
BUN SERPL-MCNC: 11 MG/DL (ref 6–23)
CALCIUM SERPL-MCNC: 9.4 MG/DL (ref 8.6–10.3)
CHLORIDE SERPL-SCNC: 106 MMOL/L (ref 98–107)
CHOLEST SERPL-MCNC: 139 MG/DL (ref 0–199)
CHOLESTEROL/HDL RATIO: 4.1
CO2 SERPL-SCNC: 31 MMOL/L (ref 21–32)
COLOR UR: ABNORMAL
CREAT SERPL-MCNC: 0.85 MG/DL (ref 0.5–1.3)
EGFRCR SERPLBLD CKD-EPI 2021: >90 ML/MIN/1.73M*2
EOSINOPHIL # BLD AUTO: 0.03 X10*3/UL (ref 0–0.7)
EOSINOPHIL NFR BLD AUTO: 1 %
ERYTHROCYTE [DISTWIDTH] IN BLOOD BY AUTOMATED COUNT: 13 % (ref 11.5–14.5)
ERYTHROCYTE [SEDIMENTATION RATE] IN BLOOD BY WESTERGREN METHOD: 12 MM/H (ref 0–20)
GLUCOSE SERPL-MCNC: 95 MG/DL (ref 74–99)
GLUCOSE UR STRIP.AUTO-MCNC: NEGATIVE MG/DL
HCT VFR BLD AUTO: 49.7 % (ref 41–52)
HDLC SERPL-MCNC: 33.5 MG/DL
HGB BLD-MCNC: 15.8 G/DL (ref 13.5–17.5)
HOLD SPECIMEN: NORMAL
IMM GRANULOCYTES # BLD AUTO: 0 X10*3/UL (ref 0–0.7)
IMM GRANULOCYTES NFR BLD AUTO: 0 % (ref 0–0.9)
KETONES UR STRIP.AUTO-MCNC: NEGATIVE MG/DL
LDH SERPL L TO P-CCNC: 118 U/L (ref 84–246)
LDLC SERPL CALC-MCNC: 79 MG/DL
LEUKOCYTE ESTERASE UR QL STRIP.AUTO: NEGATIVE
LYMPHOCYTES # BLD AUTO: 1.28 X10*3/UL (ref 1.2–4.8)
LYMPHOCYTES NFR BLD AUTO: 44.8 %
MCH RBC QN AUTO: 26.6 PG (ref 26–34)
MCHC RBC AUTO-ENTMCNC: 31.8 G/DL (ref 32–36)
MCV RBC AUTO: 84 FL (ref 80–100)
MONOCYTES # BLD AUTO: 0.22 X10*3/UL (ref 0.1–1)
MONOCYTES NFR BLD AUTO: 7.7 %
MUCOUS THREADS #/AREA URNS AUTO: NORMAL /LPF
NEUTROPHILS # BLD AUTO: 1.31 X10*3/UL (ref 1.2–7.7)
NEUTROPHILS NFR BLD AUTO: 45.8 %
NITRITE UR QL STRIP.AUTO: NEGATIVE
NON HDL CHOLESTEROL: 106 MG/DL (ref 0–149)
NRBC BLD-RTO: 0 /100 WBCS (ref 0–0)
PH UR STRIP.AUTO: 6 [PH]
PLATELET # BLD AUTO: 226 X10*3/UL (ref 150–450)
POTASSIUM SERPL-SCNC: 4.8 MMOL/L (ref 3.5–5.3)
PROT SERPL-MCNC: 6.9 G/DL (ref 6.4–8.2)
PROT UR STRIP.AUTO-MCNC: ABNORMAL MG/DL
RBC # BLD AUTO: 5.94 X10*6/UL (ref 4.5–5.9)
RBC # UR STRIP.AUTO: NEGATIVE /UL
RBC #/AREA URNS AUTO: NORMAL /HPF
SODIUM SERPL-SCNC: 139 MMOL/L (ref 136–145)
SP GR UR STRIP.AUTO: 1.02
SQUAMOUS #/AREA URNS AUTO: NORMAL /HPF
T4 FREE SERPL-MCNC: 1.52 NG/DL (ref 0.61–1.12)
TRIGL SERPL-MCNC: 131 MG/DL (ref 0–149)
TSH SERPL-ACNC: 0.07 MIU/L (ref 0.44–3.98)
UROBILINOGEN UR STRIP.AUTO-MCNC: <2 MG/DL
VLDL: 26 MG/DL (ref 0–40)
WBC # BLD AUTO: 2.9 X10*3/UL (ref 4.4–11.3)
WBC #/AREA URNS AUTO: NORMAL /HPF

## 2024-05-06 PROCEDURE — 80053 COMPREHEN METABOLIC PANEL: CPT

## 2024-05-06 PROCEDURE — 82306 VITAMIN D 25 HYDROXY: CPT

## 2024-05-06 PROCEDURE — 84443 ASSAY THYROID STIM HORMONE: CPT

## 2024-05-06 PROCEDURE — 84439 ASSAY OF FREE THYROXINE: CPT

## 2024-05-06 PROCEDURE — 85025 COMPLETE CBC W/AUTO DIFF WBC: CPT

## 2024-05-06 PROCEDURE — 99396 PREV VISIT EST AGE 40-64: CPT | Performed by: INTERNAL MEDICINE

## 2024-05-06 PROCEDURE — 85652 RBC SED RATE AUTOMATED: CPT

## 2024-05-06 PROCEDURE — 81001 URINALYSIS AUTO W/SCOPE: CPT

## 2024-05-06 PROCEDURE — 83615 LACTATE (LD) (LDH) ENZYME: CPT

## 2024-05-06 PROCEDURE — 1036F TOBACCO NON-USER: CPT | Performed by: INTERNAL MEDICINE

## 2024-05-06 PROCEDURE — 84153 ASSAY OF PSA TOTAL: CPT

## 2024-05-06 PROCEDURE — 71046 X-RAY EXAM CHEST 2 VIEWS: CPT

## 2024-05-06 PROCEDURE — 36415 COLL VENOUS BLD VENIPUNCTURE: CPT

## 2024-05-06 PROCEDURE — 80061 LIPID PANEL: CPT

## 2024-05-06 PROCEDURE — 84154 ASSAY OF PSA FREE: CPT

## 2024-05-07 ENCOUNTER — APPOINTMENT (OUTPATIENT)
Dept: PAIN MEDICINE | Facility: CLINIC | Age: 59
End: 2024-05-07
Payer: COMMERCIAL

## 2024-05-08 LAB
PSA FREE MFR SERPL: 8 %
PSA FREE SERPL-MCNC: 0.4 NG/ML
PSA SERPL IA-MCNC: 5.1 NG/ML (ref 0–4)

## 2024-07-18 ENCOUNTER — PATIENT MESSAGE (OUTPATIENT)
Dept: PRIMARY CARE | Facility: CLINIC | Age: 59
End: 2024-07-18
Payer: COMMERCIAL

## 2024-07-19 ENCOUNTER — OFFICE VISIT (OUTPATIENT)
Dept: ORTHOPEDIC SURGERY | Facility: CLINIC | Age: 59
End: 2024-07-19
Payer: COMMERCIAL

## 2024-07-19 ENCOUNTER — HOSPITAL ENCOUNTER (OUTPATIENT)
Dept: RADIOLOGY | Facility: CLINIC | Age: 59
Discharge: HOME | End: 2024-07-19
Payer: COMMERCIAL

## 2024-07-19 DIAGNOSIS — M54.50 ACUTE LOW BACK PAIN, UNSPECIFIED BACK PAIN LATERALITY, UNSPECIFIED WHETHER SCIATICA PRESENT: ICD-10-CM

## 2024-07-19 DIAGNOSIS — M54.16 LUMBAR RADICULOPATHY: Primary | ICD-10-CM

## 2024-07-19 DIAGNOSIS — S39.012A LUMBAR STRAIN, INITIAL ENCOUNTER: ICD-10-CM

## 2024-07-19 PROCEDURE — 99213 OFFICE O/P EST LOW 20 MIN: CPT | Performed by: INTERNAL MEDICINE

## 2024-07-19 PROCEDURE — 72120 X-RAY BEND ONLY L-S SPINE: CPT

## 2024-07-19 RX ORDER — HYDROCODONE BITARTRATE AND ACETAMINOPHEN 5; 325 MG/1; MG/1
1 TABLET ORAL EVERY 8 HOURS PRN
Qty: 20 TABLET | Refills: 0 | Status: SHIPPED | OUTPATIENT
Start: 2024-07-19 | End: 2024-07-26

## 2024-07-19 RX ORDER — PREDNISONE 50 MG/1
50 TABLET ORAL DAILY
Qty: 5 TABLET | Refills: 0 | Status: SHIPPED | OUTPATIENT
Start: 2024-07-19 | End: 2024-07-24

## 2024-07-19 NOTE — PATIENT COMMUNICATION
Spoke with patient.  Patient said he thinks he did something a couple days ago while at appointment at [facility].  He said his Spine dr left and the office said he had to have an MRI before he could be seen. He has appointment at end of August with Dr. Quiñones.  I told him I would reach out to the Spine center and see what I can find out.

## 2024-07-19 NOTE — PROGRESS NOTES
Acute Injury New Patient Visit    CC:   Chief Complaint   Patient presents with    Lower Back - Pain       HPI: Elmo is a 58 y.o. male presents today for evaluation for acute low back injury sustained a week ago while he was moving a chair. He notes worsening low back pain. No radiating pain down his legs, no bowel or bladder incontinence. He has a history of lumbar laminectomy with L5-S1 posterior fusion. He is here for initial evaluation and x-rays.         Review of Systems   GENERAL: Negative for malaise, significant weight loss, fever  MUSCULOSKELETAL: See HPI  NEURO:  Negative for numbness / tingling     Past Medical History  Past Medical History:   Diagnosis Date    H/O cervical spine x-ray 04/09/2024    Degenerative changes include disc space narrowing, endplate spurring, endplate sclerosis, uncovertebral spurring, posterior disc osteophyte complexes, and facet hypertrophy. There is mild subluxation at several levels, most likely related to facet degenerative disease. Degenerative changes predominantly involve the C4-C5 and C5-C6 levels    H/O chest x-ray 05/09/2024    normal    H/O CT scan     4/22: CT Calcium score: 0 The visualized segments of the lungs demonstrates minimal bullous emphysematous changes. There is basilar atelectasis.. 10/20: CT enterography: HH, moderately enlarged prostate 4/7/21: CT urogram: NO HYDRONEPHROSIS OR NEPHROLITHIASIS.  NO ABNORMALLY ENHANCED RENAL OR BLADDER MASS.  PROSTATE HYPERTROPHY.    H/O CT scan of abdomen     12/18: cyst left kidney 7mm, small HH 1/21: IMPRESSION: 3 mm obstructing calculus at the left ureterovesical junction resulting in mild hydroureter and hydronephrosis. Perinephric and periureteral stranding is most likely related to the obstruction but infection is not excluded. Prostatomegaly.    H/O CT scan of brain     12/18: normal    H/O CT scan of chest     2016: Few scattered bilateral tiny nodular/groundglass opacities <4mm    H/O diagnostic ultrasound      2015 US kidney: Normal; prostate gland is enlarged indenting the base of the urinary bladder , Left ureteral stent    H/O magnetic resonance imaging     2/12: mri SHOULDER: 2. Mild tendinosis of the distal supraspinatus tendon without discrete tear. 3. Chronic degenerative tear of the superior labrum within limits of non arthrographic study.    H/O magnetic resonance imaging of brain and brain stem 03/20/2024    Sequela of mild chronic small vessel ischemic changes, otherwise unremarkable MRI brain.    H/O magnetic resonance imaging of cervical spine     2/21: There is loss of height and signal and intervertebral disc spaces throughout the cervical spine. There are associated small bulging discs, uncovertebral joint osteophytes and facet hypertrophy. There is no measurable canal stenosis, focal disc herniation, cord compression or nerve root compression    H/O magnetic resonance imaging of lumbar spine     2009: Disc protrusions on the left at L4-5 and L5-S1, with impingement . s/p PT and injections; Central and slightly left-sided disk protrusion L5/S1 2017: Multilevel discogenic and facet hypertrophic degenerative change as  described above. Left laminotomy at L4 and L5. Recurrent disc  protrusion versus granulation tissue/epidural scar at L4-L5 and contacts  the left L5 nerve root    History of PFTs     2015 (-)       Medication review  Medication Documentation Review Audit       Reviewed by Marisol Stringer MD (Physician) on 05/06/24 at 0823      Medication Order Taking? Sig Documenting Provider Last Dose Status   cholecalciferol (Vitamin D-3) 25 MCG (1000 UT) tablet [id number] Yes Take 1 tablet (25 mcg) by mouth once daily. Historical Provider, MD  Active   diazePAM (Valium) 10 mg tablet [id number] Yes Take 1 tablet (10 mg) by mouth every 6 hours if needed for muscle spasms for up to 7 days. Marisol Stringer MD  Active   ferrous sulfate 325 (65 Fe) MG tablet [id number] Yes Take 1 tablet (65 mg of iron) by mouth 1  (one) time per week. Historical Provider, MD  Active   gabapentin (Neurontin) 100 mg capsule [id number] Yes Take 1 capsule (100 mg) by mouth 3 times a day. Marisol Stringer MD  Active   levothyroxine (Synthroid, Levoxyl) 125 mcg tablet [id number] Yes Take 1 tablet (125 mcg) by mouth once daily in the morning. Take before meals. Historical Provider, MD  Active   multivitamin tablet [id number] Yes Take 1 tablet by mouth once daily. Historical Provider, MD  Active   pantoprazole (ProtoNix) 20 mg EC tablet [id number] Yes Take 1 tablet (20 mg) by mouth once daily in the morning. Take before meals. Marisol Stringer MD  Active                    Allergies  Allergies   Allergen Reactions    Penicillins Rash     Rash    Pamelor [Nortriptyline] Other     Tachycardia and hypertension    Tamsulosin Unknown and Other     Hypotension    Tizanidine Other and Unknown     Hypotension    Vicodin [Hydrocodone-Acetaminophen] Drowsiness     Also with percocet    Latex Rash     when someone used gloves-localized reaction       Social History  Social History     Socioeconomic History    Marital status:      Spouse name: Not on file    Number of children: Not on file    Years of education: Not on file    Highest education level: Not on file   Occupational History    Not on file   Tobacco Use    Smoking status: Never    Smokeless tobacco: Never   Substance and Sexual Activity    Alcohol use: Not Currently    Drug use: Never    Sexual activity: Not on file   Other Topics Concern    Not on file   Social History Narrative    Social History:    , 3 daughters    Works as  for [employer]    Nonsmoker    No ETOH    ---    Family History:    F: Diabetes, CAD/MI  ( 74)    M Kidney stones, hypotension, OA    S: Migraine, RA                             S:        B:                                   Aunts/cousin with rectal CA; Aunt other side of family has rectal CA    2Aunt:  Colon Cancer     Social Determinants of Health      Financial Resource Strain: Not on file   Food Insecurity: Not on file   Transportation Needs: Not on file   Physical Activity: Not on file   Stress: Not on file   Social Connections: Not on file   Intimate Partner Violence: Not on file   Housing Stability: Not on file       Surgical History  Past Surgical History:   Procedure Laterality Date    BACK SURGERY  03/24/2020 7/19: revision decompression L5-S1 with fusion    BLADDER SURGERY  12/07/2018    Bladder CA removal 1/21 found incidentally on ureteroscopy low grade noninvasive urothelial cell carcinoma of the bladder    COLONOSCOPY  08/05/2020    5/15: Three polyps were found in the rectum.  measuring 3-5 mm; hyperplastic polyps; polyps (5 years) 8/2020: ileium normal; colon normal; no specimens; 5 y    CYSTOSCOPY  04/04/2022 4/23: Normal ;4/21: Normal ([facility]) 4/22: Normal aside from Prostate: Moderate lateral lobes with prolapse    CYSTOSCOPY  04/12/2024    prostate mod lateral lobes; TUR defect. trabeculation mild, inflammation mild. MARI 60 g no nodules    ESOPHAGOGASTRODUODENOSCOPY  08/05/2020 2005: SMALL HIATAL HERNIA, CHRONIC GASTRITIS, negative for H Pylori 2012 : normal 8/2020: small hiatal hernia    LUMBAR LAMINECTOMY  08/14/2019 7/19/19: 1. revision L4-S1 leminectomy. 2. Vila/radical decompression L5/S1, excision recurrent HNP. 3. PSF w instrumentation L5/S1. 4. TLIF w cage L5/S1    OTHER SURGICAL HISTORY  12/07/2018    Tonsillectomy    PROSTATE BIOPSY  03/24/2020 9/18: FINAL DIAGNOSIS  1. Prostate, left base, biopsy (A) - Benign prostate tissue.    THYROIDECTOMY  04/12/2021    Bladder surgery    UPPER GASTROINTESTINAL ENDOSCOPY  10/05/2020    Capsule endoscopy 10/20: ? debris vs ulceration noted at 3:57 35sec entended capsule time in stomach and at ileocecal valve Capsule remained in small bowel at end of study    URETEROSCOPY  04/12/2021 2/21: Left, stent placement, laser lithotripsy       Physical Exam:  GENERAL:  Patient is awake,  alert, and oriented to person place and time.  Patient appears well nourished and well kept.  Affect Calm, Not Acutely Distressed.  HEENT:  Normocephalic, Atraumatic, EOMI  CARDIOVASCULAR:  Hemodynamically stable.  RESPIRATORY:  Normal respirations with unlabored breathing.  Extremity: Low back skin is intact. No erythema or warmth. No clinical signs of infection. He can forward flex and reverse extend with pain and discomfort. Pain with left ane right lateral rotation. Mild midline lumbar tenderness.pain over the paraspinal muscles. Pain over SI joints. Quadriceps strength 5/5 on the right and left. Positive straight leg test on the right and left. He is able to walk on his heel and tip toes without difficulty.       Diagnostics: X-rays reviewed  XR chest 2 views  Narrative: Interpreted By:  Jhonatan Walsh,   STUDY:  XR CHEST 2 VIEWS      INDICATION:  Signs/Symptoms:see dx.      COMPARISON:  None      ACCESSION NUMBER(S):  [id number]      ORDERING CLINICIAN:  ALLISON THORNTON      FINDINGS:  No consolidation, effusion, edema, or pneumothorax. Heart size within  normal limits.      Impression: No evidence of acute intrathoracic abnormality.      Signed by: Jhonatan Walsh 5/9/2024 7:53 AM  Dictation workstation:   [id number]      Procedure: None    Assessment:   Lumbar strain  Low back pain  Lumbar radiculopathy     Plan: Elmo presents today for initial evaluation for acute low back injury sustained about a week ago. X-rays showed no obvious fractures. We recommended physical therapy, MRI of the lumbar spine, oral prednisone 50 mg for 5 days, hydrocodone for pain. He will follow-up with his spine specialist as scheduled.     Orders Placed This Encounter    XR lumbar spine 4+ views w flexion extension      At the conclusion of the visit there were no further questions by the patient/family regarding their plan of care.  Patient was instructed to call or return with any issues, questions, or concerns regarding their injury  and/or treatment plan described above.     07/19/24 at 9:30 AM - Willis Corcoran MD  Scribe Attestation  By signing my name below, I, Luc DunnDedra [email or web address]   attest that this documentation has been prepared under the direction and in the presence of Willis Corcoran MD.    Office: [phone]    This note was prepared using voice recognition software.  The details of this note are correct and have been reviewed, and corrected to the best of my ability.  Some grammatical errors may persist related to the Dragon software.

## 2024-07-19 NOTE — PATIENT COMMUNICATION
Spoke with patient.  Relayed since Dr. Bush was orthopedic he should go to the [facility].  Patient verbally understood.

## 2024-07-24 ENCOUNTER — EVALUATION (OUTPATIENT)
Dept: PHYSICAL THERAPY | Facility: CLINIC | Age: 59
End: 2024-07-24
Payer: COMMERCIAL

## 2024-07-24 DIAGNOSIS — S39.012A LUMBAR STRAIN, INITIAL ENCOUNTER: ICD-10-CM

## 2024-07-24 DIAGNOSIS — M54.16 LUMBAR RADICULOPATHY: ICD-10-CM

## 2024-07-24 DIAGNOSIS — M54.50 ACUTE LOW BACK PAIN, UNSPECIFIED BACK PAIN LATERALITY, UNSPECIFIED WHETHER SCIATICA PRESENT: ICD-10-CM

## 2024-07-24 PROCEDURE — 97140 MANUAL THERAPY 1/> REGIONS: CPT | Mod: GP | Performed by: PHYSICAL THERAPIST

## 2024-07-24 PROCEDURE — 97161 PT EVAL LOW COMPLEX 20 MIN: CPT | Mod: GP | Performed by: PHYSICAL THERAPIST

## 2024-07-24 PROCEDURE — 97530 THERAPEUTIC ACTIVITIES: CPT | Mod: GP | Performed by: PHYSICAL THERAPIST

## 2024-07-24 ASSESSMENT — PATIENT HEALTH QUESTIONNAIRE - PHQ9
1. LITTLE INTEREST OR PLEASURE IN DOING THINGS: NOT AT ALL
2. FEELING DOWN, DEPRESSED OR HOPELESS: NOT AT ALL
SUM OF ALL RESPONSES TO PHQ9 QUESTIONS 1 AND 2: 0

## 2024-07-24 ASSESSMENT — ENCOUNTER SYMPTOMS
OCCASIONAL FEELINGS OF UNSTEADINESS: 0
DEPRESSION: 0
LOSS OF SENSATION IN FEET: 0

## 2024-07-24 ASSESSMENT — PAIN - FUNCTIONAL ASSESSMENT: PAIN_FUNCTIONAL_ASSESSMENT: 0-10

## 2024-07-24 ASSESSMENT — PAIN SCALES - GENERAL: PAINLEVEL_OUTOF10: 6

## 2024-07-24 NOTE — PROGRESS NOTES
Physical Therapy Evaluation    Patient Name: Elmo Yap  MRN: [id number]  Time Calculation  Start Time: 1002  Stop Time: 1045  Time Calculation (min): 43 min  PT Evaluation Time Entry  PT Evaluation (Low) Time Entry: 20  PT Therapeutic Procedures Time Entry  Manual Therapy Time Entry: 8  Therapeutic Exercise Time Entry: 7  Therapeutic Activity Time Entry: 8                   Current Problem  1. Acute low back pain, unspecified back pain laterality, unspecified whether sciatica present  Referral to Physical Therapy    Follow Up In Physical Therapy      2. Lumbar strain, initial encounter  Referral to Physical Therapy    Follow Up In Physical Therapy      3. Lumbar radiculopathy  Referral to Physical Therapy    Follow Up In Physical Therapy        Insurance    Insurance reviewed   Visit number: 1  Approved number of visits: 60      ANTHEM 60V PT OT COPAY 0 DED 0 COVERAGE 80  OOP 0 NO AUTH REQ REF# GARTH [id number]  [id number]   Subjective   General:  Elmo is a 58 y.o. male presents today for evaluation for acute low back injury sustained a two weeks ago while he was moving a chair.  He has a history of lumbar laminectomy with L5-S1 posterior fusion. His fusion was in 2019. He thought the chair was light. He felt sharp pain through the lower back. No pain went down his legs. He feels lit a little bit more on the L side. He was on a steriod pack that he has finished and he is taking gabapentin. He has pain with standing from sitting  and rolling in bed. He feels a little pain with prolonged sitting, but it will get stiff if he sits for a while.  He is not experiencing pain down his legs. He is not waking up much secondary to the pain. He has a standing desk at work. He has had to modify and limit his lifting activity. He has had a history of lumbar radiculopathy prior to his surgeries but is not experiencing this currently. He has a TENS unit. The patient's goals for therapy are to be able to return to completing  all of his activities and being able to use his boat.  He denies any saddle paresthesia or acute bowel or bladder changes. He reports lower blood pressure.   Precautions:  L5-S1 fusion  Pain:  6/10  Reviewed medical screening form with pt and medical screening assessed    Imaging:   Interpreted By:  Willis Cherry,   STUDY:  XR LUMBAR SPINE 4+ VIEWS WITH FLEXION EXTENSION, 7/19/2024 9:21 am      INDICATION:  Signs/Symptoms:PAIN      ACCESSION NUMBER(S):  [id number]      ORDERING CLINICIAN:  WILLIS CHERRY    FINDINGS:  Lumbar spine x-rays four views AP and lateral view, with flexion and  extension: Satisfactory alignment of the lumbar spine. No acute  compression fracture. Stable appearing L5 laminectomy with L5-S1  posterior fusion by pedicle screws. No significant change from  previous imaging.  Objective   Observation: Unremarkable  Screening:      Lower Quarter Screen  -Dermatomes: Normal  -Myotomes: Normal  -Patellar Reflex: Normal  -Achilles Tendon Reflex: Normal  -Passive SLR: R-  55 degrees L- 40  degrees (50 degrees following treatment      Transfers: Increased time and subjective discomfort with sit to stand and bed mobility, independent      Lumbar ROM (* indicates pain)  Flex:  50 % full range*  Ext:  75  % full range, mild end range tightnes  Sidebending: R-  90% % full range  L-  80 % full range  Rotation: R- 90  % full range L-  80 % full range          Supine Hip PROM (* indicates pain)  Flexion: L 130  degrees ; R  130  degrees  IR: L 45  degrees; R 45   degrees  ER: L 35  degrees; R:  35 degrees    Posterior Pelvic Tilt: Limited ROM, proper motor control with cueing    Repeated SKTC to chest: Improved lumbar spine flexion ROM following treatment             Palpation:   -Mild increased resting tension through R lumbosacral paraspinal musculature    Outcome Measures:  Other Measures  Oswestry Disablity Index (MALATHI): 22     Treatment:   -Patient education regarding recovery timeline, rehabilitation  process and rehabilitation timeline, home exercise program demonstration and construction, review of precautions, and long term strategies to maximize functional capacity and functional independence 8 minutes  -STM through bilateral lumbosacral paraspinal musculature 8 minutes  -Supine posterior pelvic tilting with cueing 2x15  -Repeated Supine SKTC 2x8, pain free range  EDUCATION/HEP:  Access Code: [email or web address]  URL: [email or web address]  Date: 07/24/2024  Prepared by: Chace Mckinley    Exercises  - Supine Posterior Pelvic Tilt  - 2 x daily - 7 x weekly - 15 reps  - Supine Single Knee to Chest  - 2 x daily - 7 x weekly - 10 repsc  Assessment:  Patient is a 58 year old male presenting with complaints of acute on chronic lower back pain. Patient presents with the following primary physical and functional impairments and limitations:  limitations and discomfort with lumbar spine flexion and extension ROM, mild limitations with L lumbar sidebending and L lumbar rotation, impaired sciatic nerve mobility via a SLR test bilaterally, and impaired lifting capacity secondary to concordant pain.  Patient is displaying good prognosis for physical therapy care based upon subjective and objective assessment. Patient will benefit from skilled intervention to address the above mentioned impairments to maximize functional capacity and quality of life. Patient appeared to understand all education provided. Patient is displaying good prognosis for physical therapy care based upon subjective and objective assessment.       Physical Therapy Problem List  Limitations with lumbar spine flexion  2.   Limited SLR bilaterally (L more than R)  3.   Difficulty lifting       Clinical Presentation: Stable   Level of Complexity: Low   Goals:  Pt will report at least 75% improvement in  pain during everyday activities.  Pt will demo full and symmetrical AROM of lumbar spine without pain.  Pt will improve Oswestry by at least 10  points (MCID) to reflect improvement in ADLs/pain reduction.   Pt will demonstrate independence and report compliance with HEP.  Pt. Will report return to ADL lifting based activities with minimal pain/limitation, indicating improved desired functional capacity     Plan  2x/week for 2 weeks, 1x a week for 2 weeks for 6 visits     Skilled therapeutic intervention to address the above mentioned physical and functional impairments and limitations including, but not limited to: patient education, therapeutic exercise, therapeutic activity, manual therapy, body mechanics training, dry needling, blood flow restriction training, instrumented soft tissue mobilization, manual soft tissue mobilization, gait retraining, biofeedback, cryotherapy, electrical stimulation, home program development, hot pack, taping, neuromuscular re-education, self-care/home management, and vasopneumatic compression.

## 2024-07-29 ENCOUNTER — APPOINTMENT (OUTPATIENT)
Dept: ORTHOPEDIC SURGERY | Facility: CLINIC | Age: 59
End: 2024-07-29
Payer: COMMERCIAL

## 2024-07-29 DIAGNOSIS — M54.16 LUMBAR RADICULITIS: Primary | ICD-10-CM

## 2024-08-05 ENCOUNTER — APPOINTMENT (OUTPATIENT)
Dept: RADIOLOGY | Facility: HOSPITAL | Age: 59
End: 2024-08-05
Payer: COMMERCIAL

## 2024-08-12 ENCOUNTER — APPOINTMENT (OUTPATIENT)
Dept: PHYSICAL THERAPY | Facility: CLINIC | Age: 59
End: 2024-08-12
Payer: COMMERCIAL

## 2024-08-14 ENCOUNTER — TREATMENT (OUTPATIENT)
Dept: PHYSICAL THERAPY | Facility: CLINIC | Age: 59
End: 2024-08-14
Payer: COMMERCIAL

## 2024-08-14 DIAGNOSIS — M54.16 LUMBAR RADICULOPATHY: ICD-10-CM

## 2024-08-14 DIAGNOSIS — S39.012A LUMBAR STRAIN, INITIAL ENCOUNTER: ICD-10-CM

## 2024-08-14 DIAGNOSIS — M54.50 ACUTE LOW BACK PAIN, UNSPECIFIED BACK PAIN LATERALITY, UNSPECIFIED WHETHER SCIATICA PRESENT: ICD-10-CM

## 2024-08-14 PROCEDURE — 97110 THERAPEUTIC EXERCISES: CPT | Mod: GP,CQ

## 2024-08-14 PROCEDURE — 97140 MANUAL THERAPY 1/> REGIONS: CPT | Mod: GP,CQ

## 2024-08-14 ASSESSMENT — PAIN - FUNCTIONAL ASSESSMENT: PAIN_FUNCTIONAL_ASSESSMENT: 0-10

## 2024-08-14 ASSESSMENT — PAIN DESCRIPTION - DESCRIPTORS: DESCRIPTORS: SHARP

## 2024-08-14 ASSESSMENT — PAIN SCALES - GENERAL: PAINLEVEL_OUTOF10: 4

## 2024-08-14 NOTE — PROGRESS NOTES
Physical Therapy Treatment    Patient Name: Elmo Yap  MRN: [id number]  Today's Date: 8/14/2024  Time Calculation  Start Time: 0155  Stop Time: 0235  Time Calculation (min): 40 min  PT Therapeutic Procedures Time Entry  Manual Therapy Time Entry: 15  Therapeutic Exercise Time Entry: 25       Current Problem  1. Acute low back pain, unspecified back pain laterality, unspecified whether sciatica present  Follow Up In Physical Therapy      2. Lumbar strain, initial encounter  Follow Up In Physical Therapy      3. Lumbar radiculopathy  Follow Up In Physical Therapy          Subjective   General   Pt stating some reduction in static lumbar pain but still endorses sharp pain with sitting and most movement.   Insurance   Visit number: 1  Approved number of visits: 60  Precautions     Pain  Pain Assessment: 0-10  0-10 (Numeric) Pain Score: 4  Pain Type: Chronic pain  Pain Location: Back  Pain Orientation: Lower  Pain Descriptors: Sharp    Objective   Treatments:  Prone laying, 3 minutes x2  PAMELLA, 3 minutes  REIL, 15 reps   SLR's: prone/supine, 10 reps  Bridges, 10 reps    Grade 1-2 P-A, uni mobs lumbar spine - 15 minutes  Assessment   Terrific reduction familiar symptoms and improved mobility decreased end range pain extension forces in prone. Introduced ex's to progress extension, address lumbar and hip stability deficits. Had a long discussion regarding postural alignment ant importance of maintaining spinal integrity. Update HEP and reviewed.  Plan:  Progress with POC as tolerated.    OP EDUCATION:   Access Code: [email or web address]  URL: [email or web address]  Date: 08/14/2024  Prepared by: John Veronica    Goals:

## 2024-08-20 ENCOUNTER — TELEPHONE (OUTPATIENT)
Dept: PRIMARY CARE | Facility: CLINIC | Age: 59
End: 2024-08-20
Payer: COMMERCIAL

## 2024-08-20 DIAGNOSIS — K21.9 CHRONIC GERD: ICD-10-CM

## 2024-08-20 RX ORDER — PANTOPRAZOLE SODIUM 20 MG/1
20 TABLET, DELAYED RELEASE ORAL
Qty: 90 TABLET | Refills: 3 | Status: SHIPPED | OUTPATIENT
Start: 2024-08-20

## 2024-08-20 NOTE — PROGRESS NOTES
Physical Therapy Treatment    Patient Name: Elmo Yap  MRN: [id number]  Today's Date: 8/21/2024  Time Calculation  Start Time: 0915  Stop Time: 0955  Time Calculation (min): 40 min  PT Therapeutic Procedures Time Entry  Manual Therapy Time Entry: 8  Therapeutic Exercise Time Entry: 32       Current Problem  1. Acute low back pain, unspecified back pain laterality, unspecified whether sciatica present              Subjective   General   Pt stating that he feels like he has been doing somewhat better. He reports he felt OK following his previous session. He reports he will feel some stiffness through his back on the L side following practicing the repeated extension. He currently reports a left side headache. He reports a history of chronic L sided neck tightness and associated headaches. He has been utilizing trigger point injections for this and these help to some degree  Insurance   Visit number: 3  Approved number of visits: 60  Precautions     Pain   2/10 in lower back on L    Objective   Lumbar spine AROM  Flexion: 80% full range, ERP  Extension 90% full range no pain  Side shift: L- 75% full range, ERP  SLR: R- 70 degrees R- 60 degrees, concordant discomfort  Treatments:  -Standing Lumbar Side shifts to L 2x10  -Standing Lumbar side shift to L with extension 2x10  -L sciatic nerve gliding 3x10  -STM through L lumbosacral paraspinal musculature 8 minutes  -Assisted repeated SKTC 2x10 on L, 1x10 on R  -Hooklying repeated posterior pelvic tilting with hip abduction iso 2x8-10  -Seated Trunk flexion repeated  Assessment   Patient tolerated treatment well. Patient is displaying improvement in AROM lumbar spine flexion and extension compared to initial evaluation. Patient continues to display mild limitations and end range discomfort with lumbar spine flexion. These remained following repeated lumbar sidegliding and extension in standing. Patient displayed improvement in quality and quantity of lumbar spine flexion  ROM following repeated flexion exercises. Patient educated to hold on prone press up exercise as part of HEP for the time being. Primary focuses of treatment moving forward should include: improving tolerance to static and repeated lumbar spine flexion based postures and positions Patient appeared to understand all education provided. Will continue to benefit from skilled intervention to address the above mentioned impairments and limitations.     Physical Therapy Problem List  Limitations with lumbar spine flexion  2.   Limited SLR bilaterally (L more than R)  3.   Difficulty lifting  Plan:  -Continue to utilize repeated lumbar spine flexion as indicated, gradually incorporate progressive lumbopelvic loading    OP EDUCATION:  Access Code: [email or web address]  URL: [email or web address]  Date: 08/21/2024  Prepared by: Chace Mckinley    Exercises  - Seated Lumbar Flexion Stretch  - 1 x daily - 7 x weekly - 5 sets - 5 reps - 1 hold    *Hold on prone press up exercise

## 2024-08-21 ENCOUNTER — TREATMENT (OUTPATIENT)
Dept: PHYSICAL THERAPY | Facility: CLINIC | Age: 59
End: 2024-08-21
Payer: COMMERCIAL

## 2024-08-21 DIAGNOSIS — M54.50 ACUTE LOW BACK PAIN, UNSPECIFIED BACK PAIN LATERALITY, UNSPECIFIED WHETHER SCIATICA PRESENT: Primary | ICD-10-CM

## 2024-08-21 PROCEDURE — 97110 THERAPEUTIC EXERCISES: CPT | Mod: GP | Performed by: PHYSICAL THERAPIST

## 2024-08-21 PROCEDURE — 97140 MANUAL THERAPY 1/> REGIONS: CPT | Mod: GP | Performed by: PHYSICAL THERAPIST

## 2024-08-27 NOTE — PROGRESS NOTES
"Physical Therapy Treatment    Patient Name: Elmo Yap  MRN: [id number]  Today's Date: 8/28/2024  Time Calculation  Start Time: 1400  Stop Time: 1443  Time Calculation (min): 43 min  PT Therapeutic Procedures Time Entry  Therapeutic Exercise Time Entry: 33  Therapeutic Activity Time Entry: 10       Current Problem  1. Acute low back pain, unspecified back pain laterality, unspecified whether sciatica present        2. Lumbar radiculopathy                Subjective   General   Pt stating that he has been doing much better. He has not been feeling much discomfort with basic walking and other activities. He reports a little discomfort with bending. Most of the discomfort is along the left side, he does not report any pain going down his leg.   Insurance   Visit number: 4  Approved number of visits: 60  Precautions     Pain   1/10 in lower back on L    Objective   Observation: Unremarkable  Screening:        Lower Quarter Screen    -Passive SLR: R-  65 degrees L- 60  degrees, lower back tightness      Transfers: Independent        Lumbar ROM (* indicates pain)  Flex:  100 % full range  Ext:  95  % full range,   Sidebending: R-  100% % full range  L-  100 % full range  Rotation: R- 100  % full range L-  100 % full range              Supine Hip PROM (* indicates pain)  Flexion: L 130  degrees ; R  130  degrees  IR: L 45  degrees; R 45   degrees  ER: L 35  degrees; R:  35 degrees                           Outcome Measures:  Other Measures  Oswestry Disablity Index (MALATHI): 22   Treatments:  -SKTC 1x10  -Sciatic nerve mobilization on L 3x10  -Hip Bridge 2x10  -Hooklying Clamshells 2x10 (RTB)  -Hooklying counter rotation rhythmic isometrics 2x8-3\" holds  -Hooklying Opp UE/LE iso pushes 2x5-5\" holds  -Review of lifting mechanics with visual and verbal cueing 5 minutes  -Weighted unilateral carries, 15lbs, 25lbs, 2x 125 feet each weight  -Squat taps to table cueing for technique 2x10  -Supine sciatic nerve glide " 1x10  -Reassessment of objective measures, discussion of plan of care moving forward 10 minutes  Assessment   The patient has displayed improvement in AROM Lumbar spine flexion, extension, bilateral rotation and lateral flexion bilaterally, improved sciatic nerve mobility bilaterally, improved capacity for pain free transfers and improved subjective capacity for ADL performance. Patient tolerated progression and introduction of lumbopelvic/core strengthening exercises and progression of squatting, lifting and carrying activities. Patient displayed the ability to demonstrate proper lifting mechanics when cued and review in clinic. Patient is continuing to display slight limitations with left sciatic nerve mobility compared to right, but both of these have improved. Patient has achieved or has progressed strongly toward all previous established therapeutic goals. Via shared decision making, it was decided that the patient will focus on the prescribed HEP to maintain and improve ROM/strength/functional mobility gains made in physical therapy and will contact our office with any issue moving forward. The patient appeared to understand all education given and displayed verbal agreement with therapy plan of care.         Plan:  Trial HEP, follow up as needed    OP EDUCATION:  Access Code: [email or web address]  URL: [email or web address]  Date: 08/28/2024  Prepared by: Chace Mckinley    Exercises  - Supine Bridge  - 1 x daily - 7 x weekly - 2 sets - 12 reps  - Squat with Chair Touch  - 1 x daily - 7 x weekly - 2 sets - 10 reps  - Supine 90/90 Sciatic Nerve Glide with Knee Flexion/Extension  - 1 x daily - 7 x weekly - 2 sets - 10 reps    Goals:  Pt will report at least 75% improvement in  pain during everyday activities. (Progressing- 70%)  Pt will demo full and symmetrical AROM of lumbar spine without pain. (Achieved)  Pt will improve Oswestry by at least 10 points (MCID) to reflect improvement in ADLs/pain  reduction. (Achieved)  Pt will demonstrate independence and report compliance with HEP. (Progressive)  Pt. Will report return to ADL lifting based activities with minimal pain/limitation, indicating improved desired functional capacity (Achieved-has continued to minimize heavy lifting)

## 2024-08-28 ENCOUNTER — TREATMENT (OUTPATIENT)
Dept: PHYSICAL THERAPY | Facility: CLINIC | Age: 59
End: 2024-08-28
Payer: COMMERCIAL

## 2024-08-28 DIAGNOSIS — M54.16 LUMBAR RADICULOPATHY: ICD-10-CM

## 2024-08-28 DIAGNOSIS — M54.50 ACUTE LOW BACK PAIN, UNSPECIFIED BACK PAIN LATERALITY, UNSPECIFIED WHETHER SCIATICA PRESENT: Primary | ICD-10-CM

## 2024-08-28 PROCEDURE — 97110 THERAPEUTIC EXERCISES: CPT | Mod: GP | Performed by: PHYSICAL THERAPIST

## 2024-08-28 PROCEDURE — 97530 THERAPEUTIC ACTIVITIES: CPT | Mod: GP | Performed by: PHYSICAL THERAPIST

## 2024-08-29 ENCOUNTER — APPOINTMENT (OUTPATIENT)
Dept: PRIMARY CARE | Facility: CLINIC | Age: 59
End: 2024-08-29
Payer: COMMERCIAL

## 2024-11-14 ENCOUNTER — HOSPITAL ENCOUNTER (OUTPATIENT)
Dept: RADIOLOGY | Facility: CLINIC | Age: 59
Discharge: HOME | End: 2024-11-14
Payer: COMMERCIAL

## 2024-11-14 ENCOUNTER — OFFICE VISIT (OUTPATIENT)
Dept: ORTHOPEDIC SURGERY | Facility: CLINIC | Age: 59
End: 2024-11-14
Payer: COMMERCIAL

## 2024-11-14 VITALS — HEIGHT: 66 IN | WEIGHT: 151 LBS | BODY MASS INDEX: 24.27 KG/M2

## 2024-11-14 DIAGNOSIS — M79.645 PAIN OF LEFT THUMB: ICD-10-CM

## 2024-11-14 DIAGNOSIS — G56.01 CARPAL TUNNEL SYNDROME OF RIGHT WRIST: Primary | ICD-10-CM

## 2024-11-14 DIAGNOSIS — S60.211A CONTUSION OF RIGHT WRIST, INITIAL ENCOUNTER: ICD-10-CM

## 2024-11-14 DIAGNOSIS — M18.12 LOCALIZED PRIMARY OSTEOARTHRITIS OF CARPOMETACARPAL JOINT OF LEFT THUMB: ICD-10-CM

## 2024-11-14 DIAGNOSIS — G56.01 CARPAL TUNNEL SYNDROME OF RIGHT WRIST: ICD-10-CM

## 2024-11-14 PROCEDURE — 73140 X-RAY EXAM OF FINGER(S): CPT | Mod: LT

## 2024-11-14 PROCEDURE — L3924 HFO WITHOUT JOINTS PRE OTS: HCPCS | Performed by: FAMILY MEDICINE

## 2024-11-14 PROCEDURE — L3908 WHO COCK-UP NONMOLDE PRE OTS: HCPCS | Performed by: FAMILY MEDICINE

## 2024-11-14 PROCEDURE — 99214 OFFICE O/P EST MOD 30 MIN: CPT | Performed by: FAMILY MEDICINE

## 2024-11-14 PROCEDURE — 3008F BODY MASS INDEX DOCD: CPT | Performed by: FAMILY MEDICINE

## 2024-11-14 PROCEDURE — 1036F TOBACCO NON-USER: CPT | Performed by: FAMILY MEDICINE

## 2024-11-14 PROCEDURE — 73110 X-RAY EXAM OF WRIST: CPT | Mod: RT

## 2024-11-14 RX ORDER — NAPROXEN 500 MG/1
500 TABLET ORAL
Qty: 28 TABLET | Refills: 0 | Status: SHIPPED | OUTPATIENT
Start: 2024-11-14 | End: 2024-11-28

## 2024-11-14 RX ORDER — METHYLPREDNISOLONE 4 MG/1
TABLET ORAL
Qty: 1 EACH | Refills: 0 | Status: SHIPPED | OUTPATIENT
Start: 2024-11-14

## 2024-11-14 NOTE — PROGRESS NOTES
Acute Injury New Patient Visit    CC:   Chief Complaint   Patient presents with    Right Hand - Pain    Left Hand - Pain       HPI: Elmo is a 58 y.o.male who presents today with new complaints of Chronic pain discomfort to the right hand/palm for the last 2 months or so, also complains of some mild arthritic discomfort to the left thumb. He denies any known injury or trauma to the left thumb in the past. With respect to the right hand/palm he states he was utilizing his hand as a tool essentially to hammer and punched-out areas of tile in his bathroom vero back in the fall. He states this was like essentially a 1 day project over the weekend.  He figured it was a simple bruise to the right hand and will go away.  He points to the ulnar side of the palm as the area of most discomfort.        Review of Systems   GENERAL: Negative for malaise, significant weight loss, fever  MUSCULOSKELETAL: See HPI  NEURO: Negative for numbness / tingling     Past Medical History  Past Medical History:   Diagnosis Date    H/O cervical spine x-ray 04/09/2024    Degenerative changes include disc space narrowing, endplate spurring, endplate sclerosis, uncovertebral spurring, posterior disc osteophyte complexes, and facet hypertrophy. There is mild subluxation at several levels, most likely related to facet degenerative disease. Degenerative changes predominantly involve the C4-C5 and C5-C6 levels    H/O chest x-ray 05/09/2024    normal    H/O CT scan     4/22: CT Calcium score: 0 The visualized segments of the lungs demonstrates minimal bullous emphysematous changes. There is basilar atelectasis.. 10/20: CT enterography: HH, moderately enlarged prostate 4/7/21: CT urogram: NO HYDRONEPHROSIS OR NEPHROLITHIASIS.  NO ABNORMALLY ENHANCED RENAL OR BLADDER MASS.  PROSTATE HYPERTROPHY.    H/O CT scan of abdomen     12/18: cyst left kidney 7mm, small HH 1/21: IMPRESSION: 3 mm obstructing calculus at the left ureterovesical junction  resulting in mild hydroureter and hydronephrosis. Perinephric and periureteral stranding is most likely related to the obstruction but infection is not excluded. Prostatomegaly.    H/O CT scan of brain     12/18: normal    H/O CT scan of chest     2016: Few scattered bilateral tiny nodular/groundglass opacities <4mm    H/O diagnostic ultrasound     2015 US kidney: Normal; prostate gland is enlarged indenting the base of the urinary bladder , Left ureteral stent    H/O magnetic resonance imaging     2/12: mri SHOULDER: 2. Mild tendinosis of the distal supraspinatus tendon without discrete tear. 3. Chronic degenerative tear of the superior labrum within limits of non arthrographic study.    H/O magnetic resonance imaging of brain and brain stem 03/20/2024    Sequela of mild chronic small vessel ischemic changes, otherwise unremarkable MRI brain.    H/O magnetic resonance imaging of cervical spine     2/21: There is loss of height and signal and intervertebral disc spaces throughout the cervical spine. There are associated small bulging discs, uncovertebral joint osteophytes and facet hypertrophy. There is no measurable canal stenosis, focal disc herniation, cord compression or nerve root compression    H/O magnetic resonance imaging of lumbar spine     2009: Disc protrusions on the left at L4-5 and L5-S1, with impingement . s/p PT and injections; Central and slightly left-sided disk protrusion L5/S1 2017: Multilevel discogenic and facet hypertrophic degenerative change as  described above. Left laminotomy at L4 and L5. Recurrent disc  protrusion versus granulation tissue/epidural scar at L4-L5 and contacts  the left L5 nerve root    History of PFTs     2015 (-)       Medication review  Medication Documentation Review Audit       Reviewed by Cole C Budinsky, MD (Physician) on 11/14/24 at 1303      Medication Order Taking? Sig Documenting Provider Last Dose Status   cholecalciferol (Vitamin D-3) 25 MCG (1000 UT) tablet  [id number] No Take 1 tablet (25 mcg) by mouth once daily. Historical Provider, MD Taking Active   diazePAM (Valium) 10 mg tablet [id number]  Take 1 tablet (10 mg) by mouth every 6 hours if needed for muscle spasms for up to 7 days. Marisol Stringer MD   24   ferrous sulfate 325 (65 Fe) MG tablet [id number] No Take 1 tablet (65 mg of iron) by mouth 1 (one) time per week. Historical Provider, MD Taking Active   gabapentin (Neurontin) 100 mg capsule [id number]  Take 1 capsule (100 mg) by mouth 3 times a day. Marisol Stringer MD   09/15/24 2359   levothyroxine (Synthroid, Levoxyl) 125 mcg tablet [id number] No Take 1 tablet (125 mcg) by mouth once daily in the morning. Take before meals. Historical Provider, MD Taking Active   methylPREDNISolone (Medrol Dospak) 4 mg tablets [id number]  Follow schedule on package instructions Cole C Budinsky, MD  Active   multivitamin tablet [id number] No Take 1 tablet by mouth once daily. Historical Provider, MD Taking Active   naproxen (Naprosyn) 500 mg tablet [id number]  Take 1 tablet (500 mg) by mouth 2 times daily (morning and late afternoon) for 14 days. Cole C Budinsky, MD  Active   pantoprazole (ProtoNix) 20 mg EC tablet [id number]  Take 1 tablet (20 mg) by mouth once daily in the morning. Take before meals. Harmony Singletary, APRN-CNP  Active                    Allergies  Allergies   Allergen Reactions    Penicillins Rash     Rash    Pamelor [Nortriptyline] Other     Tachycardia and hypertension    Tamsulosin Unknown and Other     Hypotension    Tizanidine Other and Unknown     Hypotension    Vicodin [Hydrocodone-Acetaminophen] Drowsiness     Also with percocet    Latex Rash     when someone used gloves-localized reaction       Social History  Social History     Socioeconomic History    Marital status:      Spouse name: Not on file    Number of children: Not on file    Years of education: Not on file    Highest education level: Not on file   Occupational History     Not on file   Tobacco Use    Smoking status: Never    Smokeless tobacco: Never   Substance and Sexual Activity    Alcohol use: Not Currently    Drug use: Never    Sexual activity: Not on file   Other Topics Concern    Not on file   Social History Narrative    Social History:    , 3 daughters    Works as  for [employer]    Nonsmoker    No ETOH    ---    Family History:    F: Diabetes, CAD/MI  ( 74)    M Kidney stones, hypotension, OA    S: Migraine, RA                             S:        B:                                   Aunts/cousin with rectal CA; Aunt other side of family has rectal CA    2Aunt:  Colon Cancer     Social Drivers of Health     Financial Resource Strain: Not on file   Food Insecurity: Not on file   Transportation Needs: Not on file   Physical Activity: Not on file   Stress: Not on file   Social Connections: Not on file   Intimate Partner Violence: Not on file   Housing Stability: Not on file       Surgical History  Past Surgical History:   Procedure Laterality Date    BACK SURGERY  2020: revision decompression L5-S1 with fusion    BLADDER SURGERY  2018    Bladder CA removal  found incidentally on ureteroscopy low grade noninvasive urothelial cell carcinoma of the bladder    COLONOSCOPY  2020    5/15: Three polyps were found in the rectum.  measuring 3-5 mm; hyperplastic polyps; polyps (5 years) 2020: ileium normal; colon normal; no specimens; 5 y    CYSTOSCOPY  2022: Normal ;: Normal ([facility]) : Normal aside from Prostate: Moderate lateral lobes with prolapse    CYSTOSCOPY  2024    prostate mod lateral lobes; TUR defect. trabeculation mild, inflammation mild. MARI 60 g no nodules    ESOPHAGOGASTRODUODENOSCOPY  2020: SMALL HIATAL HERNIA, CHRONIC GASTRITIS, negative for H Pylori  : normal 2020: small hiatal hernia    LUMBAR LAMINECTOMY  2019: 1. revision L4-S1 leminectomy. 2.  Vila/radical decompression L5/S1, excision recurrent HNP. 3. PSF w instrumentation L5/S1. 4. TLIF w cage L5/S1    OTHER SURGICAL HISTORY  12/07/2018    Tonsillectomy    PROSTATE BIOPSY  03/24/2020 9/18: FINAL DIAGNOSIS  1. Prostate, left base, biopsy (A) - Benign prostate tissue.    THYROIDECTOMY  04/12/2021    Bladder surgery    UPPER GASTROINTESTINAL ENDOSCOPY  10/05/2020    Capsule endoscopy 10/20: ? debris vs ulceration noted at 3:57 35sec entended capsule time in stomach and at ileocecal valve Capsule remained in small bowel at end of study    URETEROSCOPY  04/12/2021 2/21: Left, stent placement, laser lithotripsy       Physical Exam:  GENERAL:  Patient is awake, alert, and oriented to person place and time.  Patient appears well nourished and well kept.  Affect Calm, Not Acutely Distressed.  HEENT:  Normocephalic, Atraumatic, EOMI  CARDIOVASCULAR:  Hemodynamically stable.  RESPIRATORY:  Normal respirations with unlabored breathing.  NEURO: Gross sensation intact to the upper extremities bilaterally.  Extremity: Right hand and wrist exam demonstrates skin which is warm pink well-perfused.  He has tenderness palpation over the hypothenar eminence towards the base of the wrist at the level just distal to the flexor crease.  There is no thumb sided pain.  He is able to give a thumbs up and okay sign without much issue.  He does have some mild discomfort with grasping gripping and twisting.  There are no open cuts wounds or sores.  No obvious palpable crepitus or palpable deformity.  Left thumb demonstrates classic CMC arthritic posture fullness and palpable bony changes with limited range of motion and crepitus.  He can give a thumbs up and okay sign with mild discomfort no pain at the IP joint there is no laxity with valgus stress.  Remainder the left upper extremity is neurovasc intact and benign.        Diagnostics: 4 views of the right wrist including carpal tunnel view do not demonstrate any obvious  presence for any displaced fracture or dislocation.  There is no obvious bony abnormality seen about the wrist.  Left thumb demonstrates moderate CMC arthrosis.        Procedure: None  Procedures    Assessment:   Problem List Items Addressed This Visit       Localized primary osteoarthritis of carpometacarpal joint of left thumb    Relevant Orders    [facility] Comfort Cool     Other Visit Diagnoses       Carpal tunnel syndrome of right wrist    -  Primary    Relevant Medications    methylPREDNISolone (Medrol Dospak) 4 mg tablets    naproxen (Naprosyn) 500 mg tablet    Other Relevant Orders    XR wrist right 3+ views    Wrist brace    Pain of left thumb        Relevant Medications    methylPREDNISolone (Medrol Dospak) 4 mg tablets    naproxen (Naprosyn) 500 mg tablet    Other Relevant Orders    XR thumb left MIN 2 views    Contusion of right wrist, initial encounter        Relevant Orders    Wrist brace             Plan: At this time with respect to the patient's left thumb CMC arthritis we will offer him a Comfort Cool brace.  Regarding the right hand pain which is chronic will offer him a simple trauma splint.  Recommended oral steroid pack and anti-inflammatory as he was not interested in injection here today.  Discussed with the patient that there could certainly be a small bony abnormality to the right wrist due to his repetitive overuse could have been a small stress injury to the hamate or other carpal bone however given the chronicity greater than 2 months out we will hold off and defer stat CT scan or further advanced imaging for the time being.  Will allow him to rest and allow the wrist and hand to settle down as he is not really rested it over the last several months.  Will defer any further evaluation and management to one of our hand and wrist specialist going forward.  Patient  Orders Placed This Encounter    [facility] Comfort Cool    Wrist brace    XR wrist right 3+ views    XR thumb left MIN 2 views     methylPREDNISolone (Medrol Dospak) 4 mg tablets    naproxen (Naprosyn) 500 mg tablet      At the conclusion of the visit there were no further questions by the patient/family regarding their plan of care.  Patient was instructed to call or return with any issues, questions, or concerns regarding their injury and/or treatment plan described above.     11/14/24 at 1:09 PM - Cole C Budinsky, MD    Office: [phone]    This note was prepared using voice recognition software.  The details of this note are correct and have been reviewed, and corrected to the best of my ability.  Some grammatical errors may persist related to the Dragon software.

## 2024-12-05 ENCOUNTER — OFFICE VISIT (OUTPATIENT)
Dept: PRIMARY CARE | Facility: CLINIC | Age: 59
End: 2024-12-05
Payer: COMMERCIAL

## 2024-12-05 ENCOUNTER — APPOINTMENT (OUTPATIENT)
Dept: ORTHOPEDIC SURGERY | Facility: CLINIC | Age: 59
End: 2024-12-05
Payer: COMMERCIAL

## 2024-12-05 ENCOUNTER — TELEPHONE (OUTPATIENT)
Dept: PRIMARY CARE | Facility: CLINIC | Age: 59
End: 2024-12-05

## 2024-12-05 ENCOUNTER — LAB (OUTPATIENT)
Dept: LAB | Facility: LAB | Age: 59
End: 2024-12-05
Payer: COMMERCIAL

## 2024-12-05 VITALS
DIASTOLIC BLOOD PRESSURE: 76 MMHG | WEIGHT: 141 LBS | HEIGHT: 67 IN | TEMPERATURE: 97.4 F | OXYGEN SATURATION: 97 % | BODY MASS INDEX: 22.13 KG/M2 | SYSTOLIC BLOOD PRESSURE: 131 MMHG | HEART RATE: 75 BPM

## 2024-12-05 DIAGNOSIS — E03.9 HYPOTHYROIDISM, ADULT: ICD-10-CM

## 2024-12-05 DIAGNOSIS — D72.819 LEUKOPENIA, UNSPECIFIED TYPE: ICD-10-CM

## 2024-12-05 DIAGNOSIS — D72.89 ATYPICAL LYMPHOCYTES PRESENT ON PERIPHERAL BLOOD SMEAR: Primary | ICD-10-CM

## 2024-12-05 DIAGNOSIS — R80.9 PROTEINURIA, UNSPECIFIED TYPE: ICD-10-CM

## 2024-12-05 DIAGNOSIS — R53.83 FATIGUE, UNSPECIFIED TYPE: ICD-10-CM

## 2024-12-05 DIAGNOSIS — E55.9 VITAMIN D DEFICIENCY: ICD-10-CM

## 2024-12-05 DIAGNOSIS — R63.4 WEIGHT LOSS, UNINTENTIONAL: Primary | ICD-10-CM

## 2024-12-05 DIAGNOSIS — R63.4 WEIGHT LOSS, UNINTENTIONAL: ICD-10-CM

## 2024-12-05 DIAGNOSIS — Z85.51 HISTORY OF BLADDER CANCER: ICD-10-CM

## 2024-12-05 DIAGNOSIS — G44.219 TTH (TENSION-TYPE HEADACHE), INFREQUENT EPISODIC TYPE: ICD-10-CM

## 2024-12-05 DIAGNOSIS — Z85.850 HISTORY OF THYROID CANCER: ICD-10-CM

## 2024-12-05 DIAGNOSIS — N40.0 BENIGN PROSTATIC HYPERPLASIA, UNSPECIFIED WHETHER LOWER URINARY TRACT SYMPTOMS PRESENT: ICD-10-CM

## 2024-12-05 LAB
ALBUMIN SERPL BCP-MCNC: 4.4 G/DL (ref 3.4–5)
ALP SERPL-CCNC: 91 U/L (ref 33–120)
ALT SERPL W P-5'-P-CCNC: 22 U/L (ref 10–52)
ANION GAP SERPL CALC-SCNC: 9 MMOL/L (ref 10–20)
APPEARANCE UR: ABNORMAL
AST SERPL W P-5'-P-CCNC: 18 U/L (ref 9–39)
BASOPHILS # BLD AUTO: 0.02 X10*3/UL (ref 0–0.1)
BASOPHILS NFR BLD AUTO: 0.7 %
BILIRUB SERPL-MCNC: 1.7 MG/DL (ref 0–1.2)
BILIRUB UR STRIP.AUTO-MCNC: NEGATIVE MG/DL
BUN SERPL-MCNC: 8 MG/DL (ref 6–23)
CALCIUM SERPL-MCNC: 9.3 MG/DL (ref 8.6–10.3)
CHLORIDE SERPL-SCNC: 104 MMOL/L (ref 98–107)
CO2 SERPL-SCNC: 30 MMOL/L (ref 21–32)
COLOR UR: YELLOW
CREAT SERPL-MCNC: 0.73 MG/DL (ref 0.5–1.3)
CRP SERPL-MCNC: 0.16 MG/DL
EGFRCR SERPLBLD CKD-EPI 2021: >90 ML/MIN/1.73M*2
EOSINOPHIL # BLD AUTO: 0.02 X10*3/UL (ref 0–0.7)
EOSINOPHIL NFR BLD AUTO: 0.7 %
ERYTHROCYTE [DISTWIDTH] IN BLOOD BY AUTOMATED COUNT: 12.6 % (ref 11.5–14.5)
ERYTHROCYTE [SEDIMENTATION RATE] IN BLOOD BY WESTERGREN METHOD: 10 MM/H (ref 0–20)
GLUCOSE SERPL-MCNC: 94 MG/DL (ref 74–99)
GLUCOSE UR STRIP.AUTO-MCNC: NORMAL MG/DL
HCT VFR BLD AUTO: 51.5 % (ref 41–52)
HETEROPH AB SERPLBLD QL IA.RAPID: NEGATIVE
HGB BLD-MCNC: 16.8 G/DL (ref 13.5–17.5)
IMM GRANULOCYTES # BLD AUTO: 0.01 X10*3/UL (ref 0–0.7)
IMM GRANULOCYTES NFR BLD AUTO: 0.3 % (ref 0–0.9)
KETONES UR STRIP.AUTO-MCNC: NEGATIVE MG/DL
LEUKOCYTE ESTERASE UR QL STRIP.AUTO: NEGATIVE
LYMPHOCYTES # BLD AUTO: 1.35 X10*3/UL (ref 1.2–4.8)
LYMPHOCYTES NFR BLD AUTO: 44.6 %
MCH RBC QN AUTO: 27.1 PG (ref 26–34)
MCHC RBC AUTO-ENTMCNC: 32.6 G/DL (ref 32–36)
MCV RBC AUTO: 83 FL (ref 80–100)
MONOCYTES # BLD AUTO: 0.21 X10*3/UL (ref 0.1–1)
MONOCYTES NFR BLD AUTO: 6.9 %
MUCOUS THREADS #/AREA URNS AUTO: NORMAL /LPF
NEUTROPHILS # BLD AUTO: 1.42 X10*3/UL (ref 1.2–7.7)
NEUTROPHILS NFR BLD AUTO: 46.8 %
NITRITE UR QL STRIP.AUTO: NEGATIVE
NRBC BLD-RTO: 0 /100 WBCS (ref 0–0)
PATH REVIEW-CBC DIFFERENTIAL: NORMAL
PH UR STRIP.AUTO: 7.5 [PH]
PLATELET # BLD AUTO: 183 X10*3/UL (ref 150–450)
POTASSIUM SERPL-SCNC: 4.2 MMOL/L (ref 3.5–5.3)
PROT SERPL-MCNC: 6.9 G/DL (ref 6.4–8.2)
PROT UR STRIP.AUTO-MCNC: ABNORMAL MG/DL
RBC # BLD AUTO: 6.2 X10*6/UL (ref 4.5–5.9)
RBC # UR STRIP.AUTO: NEGATIVE /UL
RBC #/AREA URNS AUTO: NORMAL /HPF
SODIUM SERPL-SCNC: 139 MMOL/L (ref 136–145)
SP GR UR STRIP.AUTO: 1.02
SQUAMOUS #/AREA URNS AUTO: NORMAL /HPF
T4 FREE SERPL-MCNC: 1.74 NG/DL (ref 0.61–1.12)
TSH SERPL-ACNC: 0.01 MIU/L (ref 0.44–3.98)
UROBILINOGEN UR STRIP.AUTO-MCNC: NORMAL MG/DL
WBC # BLD AUTO: 3 X10*3/UL (ref 4.4–11.3)
WBC #/AREA URNS AUTO: NORMAL /HPF
WBC CLUMPS #/AREA URNS AUTO: NORMAL /HPF

## 2024-12-05 PROCEDURE — 99214 OFFICE O/P EST MOD 30 MIN: CPT | Performed by: INTERNAL MEDICINE

## 2024-12-05 PROCEDURE — 81001 URINALYSIS AUTO W/SCOPE: CPT

## 2024-12-05 PROCEDURE — 86308 HETEROPHILE ANTIBODY SCREEN: CPT

## 2024-12-05 PROCEDURE — 85025 COMPLETE CBC W/AUTO DIFF WBC: CPT

## 2024-12-05 PROCEDURE — 85060 BLOOD SMEAR INTERPRETATION: CPT | Performed by: INTERNAL MEDICINE

## 2024-12-05 PROCEDURE — 80053 COMPREHEN METABOLIC PANEL: CPT

## 2024-12-05 PROCEDURE — 85652 RBC SED RATE AUTOMATED: CPT

## 2024-12-05 PROCEDURE — 3008F BODY MASS INDEX DOCD: CPT | Performed by: INTERNAL MEDICINE

## 2024-12-05 PROCEDURE — 36415 COLL VENOUS BLD VENIPUNCTURE: CPT

## 2024-12-05 PROCEDURE — 84439 ASSAY OF FREE THYROXINE: CPT

## 2024-12-05 PROCEDURE — 84443 ASSAY THYROID STIM HORMONE: CPT

## 2024-12-05 PROCEDURE — 1036F TOBACCO NON-USER: CPT | Performed by: INTERNAL MEDICINE

## 2024-12-05 PROCEDURE — 87389 HIV-1 AG W/HIV-1&-2 AB AG IA: CPT

## 2024-12-05 PROCEDURE — 86140 C-REACTIVE PROTEIN: CPT

## 2024-12-05 PROCEDURE — 86644 CMV ANTIBODY: CPT

## 2024-12-05 NOTE — PROGRESS NOTES
CC/HPI:   Weight Loss and Fatigue (Noticed within noticed the weight loss and on and off fatigue).  Had lost 4lbs when I saw him in May so did labs and   CXR in May ,normal  Has lost 10lbs in 1 month, was 154 lb in August, 151lbs in November, 141lbs today   (147lbs in May, 151lbs in March)  Nonew meds  Some days wakes up tired and sluggish all day  No change in diet  No F/NS, occ chills  CT A/P in 7/23 normal  EGD 202o: 2005: SMALL HIATAL HERNIA, CHRONIC GASTRITIS, negative for H Pylori 2012 : normal 8/2020: small hiatal hernia   Cscope 2020: 5/15: Three polyps were found in the rectum. measuring 3-5 mm; hyperplastic polyps; polyps (5 years) 8/2020: ileium normal; colon normal; no specimens; 5 y     Labs done also:  Component      Latest Ref Rng 5/6/2024   WBC      4.4 - 11.3 x10*3/uL 2.9 (L)    nRBC      0.0 - 0.0 /100 WBCs 0.0    RBC      4.50 - 5.90 x10*6/uL 5.94 (H)    HEMOGLOBIN      13.5 - 17.5 g/dL 15.8    HEMATOCRIT      41.0 - 52.0 % 49.7    MCV      80 - 100 fL 84    MCH      26.0 - 34.0 pg 26.6    MCHC      32.0 - 36.0 g/dL 31.8 (L)    RED CELL DISTRIBUTION WIDTH      11.5 - 14.5 % 13.0    Platelets      150 - 450 x10*3/uL 226    Neutrophils %      40.0 - 80.0 % 45.8    Immature Granulocytes %, Automated      0.0 - 0.9 % 0.0    Lymphocytes %      13.0 - 44.0 % 44.8    Monocytes %      2.0 - 10.0 % 7.7    Eosinophils %      0.0 - 6.0 % 1.0    Basophils %      0.0 - 2.0 % 0.7    Neutrophils Absolute      1.20 - 7.70 x10*3/uL 1.31    Immature Granulocytes Absolute, Automated      0.00 - 0.70 x10*3/uL 0.00    Lymphocytes Absolute      1.20 - 4.80 x10*3/uL 1.28    Monocytes Absolute      0.10 - 1.00 x10*3/uL 0.22    Eosinophils Absolute      0.00 - 0.70 x10*3/uL 0.03    Basophils Absolute      0.00 - 0.10 x10*3/uL 0.02    GLUCOSE      74 - 99 mg/dL 95    SODIUM      136 - 145 mmol/L 139    POTASSIUM      3.5 - 5.3 mmol/L 4.8    CHLORIDE      98 - 107 mmol/L 106    Bicarbonate      21 - 32 mmol/L 31    Anion  Gap      10 - 20 mmol/L 7 (L)    Blood Urea Nitrogen      6 - 23 mg/dL 11    Creatinine      0.50 - 1.30 mg/dL 0.85    EGFR      >60 mL/min/1.73m*2 >90    Calcium      8.6 - 10.3 mg/dL 9.4    Albumin      3.4 - 5.0 g/dL 4.3    Alkaline Phosphatase      33 - 120 U/L 90    Total Protein      6.4 - 8.2 g/dL 6.9    AST      9 - 39 U/L 22    Bilirubin Total      0.0 - 1.2 mg/dL 0.9    ALT      10 - 52 U/L 32    Color, Urine      Straw, Yellow  Sandra ! (N)    Appearance, Urine      Clear  Hazy ! (N)    Specific Gravity, Urine      1.005 - 1.035  1.023    pH, Urine      5.0, 5.5, 6.0, 6.5, 7.0, 7.5, 8.0  6.0    Protein, Urine      NEGATIVE mg/dL 30 (1+) ! (N)    Glucose, Urine      NEGATIVE mg/dL NEGATIVE    Blood, Urine      NEGATIVE  NEGATIVE    Ketones, Urine      NEGATIVE mg/dL NEGATIVE    Bilirubin, Urine      NEGATIVE  NEGATIVE    Urobilinogen, Urine      <2.0 mg/dL <2.0    Nitrite, Urine      NEGATIVE  NEGATIVE    Leukocyte Esterase, Urine      NEGATIVE  NEGATIVE    CHOLESTEROL      0 - 199 mg/dL 139    HDL CHOLESTEROL      mg/dL 33.5    Cholesterol/HDL Ratio 4.1    LDL Calculated      <=99 mg/dL 79    VLDL      0 - 40 mg/dL 26    TRIGLYCERIDES      0 - 149 mg/dL 131    Non HDL Cholesterol      0 - 149 mg/dL 106    WBC, Urine      1-5, NONE /HPF 1-5    RBC, Urine      NONE, 1-2, 3-5 /HPF 3-5    Squamous Epithelial Cells, Urine      Reference range not established. /HPF 1-9 (SPARSE)    Mucus, Urine      Reference range not established. /LPF 2+    PSA      0.0 - 4.0 ng/mL 5.1 (H)    PSA, Free      ng/mL 0.4    PSA, Free Pct      % 8    Vitamin D, 25-Hydroxy, Total      30 - 100 ng/mL 27 (L)    Thyroid Stimulating Hormone      0.44 - 3.98 mIU/L 0.07 (L)    LDH      84 - 246 U/L 118    Sed Rate      0 - 20 mm/h 12    Extra Tube Hold for add-ons.    Thyroxine, Free      0.61 - 1.12 ng/dL 1.52 (H)    Hx thyroid CA so keeps TSH low  On Vit D    Saw Urology in 4/24: MARI- 60 grams no nodules   Reepat PSA 1.95 in 8/24  Cysto  4/24: prostate mod lateral lobes; TUR defect. trabeculation mild, inflammation mild.     WBC  4.4 - 11.3 x10*3/uL 2.9 Low  3.3 Low  R 2.9 Low  R 3.2 Low  R 5.2 R 3.1 Low  R     Assessment and Plan:  Problem List Items Addressed This Visit          Medium    BPH (benign prostatic hyperplasia)    Overview     with elevated PSA  Has seen Urology at [facility]  On Flomax in past(dizziness, ED) until changed to Uroxatral in 10/21  9/18:  Prostate, left base, biopsy (A) - Benign prostate tissue.  S/p green light laser July 2023 (complicated by infection)         History of bladder cancer    Overview     1/21: NONINVASIVE PAPILLARY UROTHELIAL CARCINOMA, LOW GRADE  s/p removal  4/21 CT Urogram and CYstoscopy normal  Urine Cytolgy 4/22/24: Negative for high-grade urothelial carcinoma.   Managed by Urology [facility]  Annual Cystoscopy needed: last 4/12/24 (-)         History of thyroid cancer    Overview     Papillary carcinoma - treated with surgery (thyroidectomy) and radioactive iodine therapy  TSH should be 0.1 or higher per Endo (Epic message 5/4/23)         Relevant Orders    TSH with reflex to Free T4 if abnormal    Hypothyroidism, adult    Overview     post thyroidectomy from papillary CA.   TSH needs to be <0.04  on synthroid         Relevant Orders    TSH with reflex to Free T4 if abnormal    Leukopenia    Overview     6/26/20: 2.9  Benign cause chava  Will monitor         Relevant Orders    CBC and Auto Differential    Comprehensive metabolic panel    TSH with reflex to Free T4 if abnormal    Sedimentation Rate    C-reactive protein    CT chest abdomen pelvis w IV contrast    Slide Request    TTH (tension-type headache), infrequent episodic type    Overview     Tension and migraine.   Valium daily prn  Gabapentin helped  S/p Trigger Point injections  Seeing Neuro, exploring Botox         Current Assessment & Plan     Consider restarting Gabapentin         Vitamin D deficiency    Overview     5/24: 27 on 1K daily  Goal 30-40  "given hx kidney stones  Supratherapeutic on 5K daily  On 1K daily         Current Assessment & Plan     Add 1K 2x/week  Goal 30-35          Other Visit Diagnoses       Weight loss, unintentional    -  Primary    ? due topathology vs overactive thyroid (TSH is lowest it has been in 3 years)  Get labs /CTs  If (-) would decrease thyroid med dose a bit if Ok w/endo    Relevant Orders    CBC and Auto Differential    Comprehensive metabolic panel    TSH with reflex to Free T4 if abnormal    Sedimentation Rate    C-reactive protein    CT chest abdomen pelvis w IV contrast    Slide Request    Fatigue, unspecified type        Relevant Orders    CBC and Auto Differential    Comprehensive metabolic panel    TSH with reflex to Free T4 if abnormal    Sedimentation Rate    C-reactive protein    CT chest abdomen pelvis w IV contrast    Slide Request              ROS otherwise negative aside from what was mentioned above in HPI.    Vitals  /76   Pulse 75   Temp 36.3 °C (97.4 °F)   Ht 1.689 m (5' 6.5\")   Wt 64 kg (141 lb)   SpO2 97%   BMI 22.42 kg/m²   Body mass index is 22.42 kg/m².  Physical Exam  Gen: Alert, NAD  HEENT:  PERRLA, EOMI, conjunctiva and sclera normal in appearance. External auditory canals/TMs normal;; Oral cavity and posterior pharynx without lesions/exudate  Neck:  Supple with FROM; No masses/nodes palpable; Thyroid nontender and without nodules; No EVELIO  Respiratory:  Lungs CTAB  Cardiovascular:  Heart RRR. No M/R/G. Peripheral pulses equal bilaterally  Abdomen:  Soft, nontender, BS present throughout; No R/G/R; No HSM or masses palpated  Extremities:  FROM all extremities; Muscle strength grossly normal with good tone  Neuro:  CN II-XII intact; Reflexes 2+/2+; Gross motor and sensory intact  Skin:  No suspicious lesions present  Breast:  no axillary lymphadenopathy        Allergies and Medications  Penicillins, Pamelor [nortriptyline], Tamsulosin, Tizanidine, Vicodin [hydrocodone-acetaminophen], and " Latex  Current Outpatient Medications   Medication Instructions    cholecalciferol (VITAMIN D-3) 25 mcg, Daily    diazePAM (VALIUM) 10 mg, oral, Every 6 hours PRN    ferrous sulfate 325 (65 Fe) MG tablet 65 mg of iron, Once Weekly    levothyroxine (SYNTHROID, LEVOXYL) 125 mcg, Daily before breakfast    multivitamin tablet 1 tablet, Daily    pantoprazole (PROTONIX) 20 mg, oral, Daily before breakfast

## 2024-12-06 ENCOUNTER — LAB (OUTPATIENT)
Dept: LAB | Facility: LAB | Age: 59
End: 2024-12-06
Payer: COMMERCIAL

## 2024-12-06 DIAGNOSIS — R63.4 WEIGHT LOSS, UNINTENTIONAL: ICD-10-CM

## 2024-12-06 DIAGNOSIS — D72.89 ATYPICAL LYMPHOCYTES PRESENT ON PERIPHERAL BLOOD SMEAR: ICD-10-CM

## 2024-12-06 DIAGNOSIS — D72.819 LEUKOPENIA, UNSPECIFIED TYPE: ICD-10-CM

## 2024-12-06 DIAGNOSIS — R53.83 FATIGUE, UNSPECIFIED TYPE: ICD-10-CM

## 2024-12-06 LAB
CMV IGG AVIDITY SERPL IA-RTO: REACTIVE %
HIV 1+2 AB+HIV1 P24 AG SERPL QL IA: NONREACTIVE

## 2024-12-06 PROCEDURE — 86645 CMV ANTIBODY IGM: CPT

## 2024-12-06 PROCEDURE — 36415 COLL VENOUS BLD VENIPUNCTURE: CPT

## 2024-12-09 LAB — CMV IGM SERPL-ACNC: <8 AU/ML

## 2024-12-16 ENCOUNTER — HOSPITAL ENCOUNTER (OUTPATIENT)
Dept: RADIOLOGY | Facility: CLINIC | Age: 59
Discharge: HOME | End: 2024-12-16
Payer: COMMERCIAL

## 2024-12-16 DIAGNOSIS — R63.4 WEIGHT LOSS, UNINTENTIONAL: ICD-10-CM

## 2024-12-16 DIAGNOSIS — D72.819 LEUKOPENIA, UNSPECIFIED TYPE: ICD-10-CM

## 2024-12-16 DIAGNOSIS — K21.9 CHRONIC GERD: ICD-10-CM

## 2024-12-16 DIAGNOSIS — R53.83 FATIGUE, UNSPECIFIED TYPE: ICD-10-CM

## 2024-12-16 PROCEDURE — 74177 CT ABD & PELVIS W/CONTRAST: CPT

## 2024-12-16 PROCEDURE — 71260 CT THORAX DX C+: CPT | Performed by: RADIOLOGY

## 2024-12-16 PROCEDURE — 2550000001 HC RX 255 CONTRASTS: Performed by: INTERNAL MEDICINE

## 2024-12-16 PROCEDURE — 74177 CT ABD & PELVIS W/CONTRAST: CPT | Performed by: RADIOLOGY

## 2024-12-16 RX ORDER — PANTOPRAZOLE SODIUM 20 MG/1
20 TABLET, DELAYED RELEASE ORAL
Qty: 90 TABLET | Refills: 3 | Status: SHIPPED | OUTPATIENT
Start: 2024-12-16

## 2024-12-18 DIAGNOSIS — R63.4 WEIGHT LOSS, UNINTENTIONAL: Primary | ICD-10-CM

## 2024-12-18 DIAGNOSIS — Z85.850 HISTORY OF THYROID CANCER: ICD-10-CM

## 2024-12-18 DIAGNOSIS — R91.8 LUNG MASS: ICD-10-CM

## 2024-12-18 DIAGNOSIS — Z85.51 HISTORY OF BLADDER CANCER: ICD-10-CM

## 2024-12-18 DIAGNOSIS — R53.83 FATIGUE, UNSPECIFIED TYPE: ICD-10-CM

## 2024-12-20 ENCOUNTER — TELEPHONE (OUTPATIENT)
Dept: PRIMARY CARE | Facility: CLINIC | Age: 59
End: 2024-12-20
Payer: COMMERCIAL

## 2024-12-20 NOTE — TELEPHONE ENCOUNTER
Pt's wife Lea called and would like assistance in scheduling with Dr. Oswald for pulm referral. Pt also stated that they would like to see [facility] for Thoracic I informed that they would have to schedule that because its not [facility].

## 2024-12-23 ENCOUNTER — APPOINTMENT (OUTPATIENT)
Dept: RADIOLOGY | Facility: CLINIC | Age: 59
End: 2024-12-23
Payer: COMMERCIAL

## 2025-01-30 ENCOUNTER — APPOINTMENT (OUTPATIENT)
Facility: CLINIC | Age: 60
End: 2025-01-30
Payer: COMMERCIAL

## 2025-02-05 ENCOUNTER — TELEPHONE (OUTPATIENT)
Dept: HEMATOLOGY/ONCOLOGY | Facility: CLINIC | Age: 60
End: 2025-02-05
Payer: COMMERCIAL

## 2025-02-06 NOTE — TELEPHONE ENCOUNTER
Elmo Yap  P [facility] Clerical (supporting Alethea Brumfield MD)2 days ago 2/5/2025    Elmo Yap would like to cancel the following appointments:     Alethea Brumfield in [facility]ON[facility] ([phone]), 2/7/2025  3:20 PM     Comments:  I want to reschedule if I can come 3:30 that works as I will be finishing job interviews and will not arrive on time

## 2025-02-07 ENCOUNTER — APPOINTMENT (OUTPATIENT)
Dept: HEMATOLOGY/ONCOLOGY | Facility: CLINIC | Age: 60
End: 2025-02-07
Payer: COMMERCIAL

## 2025-03-03 ENCOUNTER — LAB (OUTPATIENT)
Dept: LAB | Facility: CLINIC | Age: 60
End: 2025-03-03
Payer: COMMERCIAL

## 2025-03-03 ENCOUNTER — OFFICE VISIT (OUTPATIENT)
Dept: HEMATOLOGY/ONCOLOGY | Facility: CLINIC | Age: 60
End: 2025-03-03
Payer: COMMERCIAL

## 2025-03-03 VITALS
BODY MASS INDEX: 23.17 KG/M2 | SYSTOLIC BLOOD PRESSURE: 129 MMHG | TEMPERATURE: 95.4 F | RESPIRATION RATE: 16 BRPM | HEART RATE: 71 BPM | DIASTOLIC BLOOD PRESSURE: 88 MMHG | WEIGHT: 145.72 LBS | OXYGEN SATURATION: 97 %

## 2025-03-03 DIAGNOSIS — D72.819 LEUKOPENIA, UNSPECIFIED TYPE: Primary | ICD-10-CM

## 2025-03-03 DIAGNOSIS — K21.9 CHRONIC GERD: ICD-10-CM

## 2025-03-03 DIAGNOSIS — E55.9 VITAMIN D DEFICIENCY: ICD-10-CM

## 2025-03-03 DIAGNOSIS — D72.89 ATYPICAL LYMPHOCYTES PRESENT ON PERIPHERAL BLOOD SMEAR: ICD-10-CM

## 2025-03-03 DIAGNOSIS — Z85.850 HISTORY OF THYROID CANCER: ICD-10-CM

## 2025-03-03 DIAGNOSIS — D72.819 LEUKOPENIA, UNSPECIFIED TYPE: ICD-10-CM

## 2025-03-03 LAB
BASOPHILS # BLD AUTO: 0.04 X10*3/UL (ref 0–0.1)
BASOPHILS NFR BLD AUTO: 1.4 %
EOSINOPHIL # BLD AUTO: 0.03 X10*3/UL (ref 0–0.7)
EOSINOPHIL NFR BLD AUTO: 1 %
ERYTHROCYTE [DISTWIDTH] IN BLOOD BY AUTOMATED COUNT: 13.3 % (ref 11.5–14.5)
HCT VFR BLD AUTO: 52.9 % (ref 41–52)
HGB BLD-MCNC: 16.9 G/DL (ref 13.5–17.5)
HOLD SPECIMEN: NORMAL
IMM GRANULOCYTES # BLD AUTO: 0.01 X10*3/UL (ref 0–0.7)
IMM GRANULOCYTES NFR BLD AUTO: 0.3 % (ref 0–0.9)
LYMPHOCYTES # BLD AUTO: 1.13 X10*3/UL (ref 1.2–4.8)
LYMPHOCYTES NFR BLD AUTO: 39 %
MCH RBC QN AUTO: 27 PG (ref 26–34)
MCHC RBC AUTO-ENTMCNC: 31.9 G/DL (ref 32–36)
MCV RBC AUTO: 85 FL (ref 80–100)
MONOCYTES # BLD AUTO: 0.19 X10*3/UL (ref 0.1–1)
MONOCYTES NFR BLD AUTO: 6.6 %
NEUTROPHILS # BLD AUTO: 1.5 X10*3/UL (ref 1.2–7.7)
NEUTROPHILS NFR BLD AUTO: 51.7 %
PLATELET # BLD AUTO: 196 X10*3/UL (ref 150–450)
RBC # BLD AUTO: 6.25 X10*6/UL (ref 4.5–5.9)
WBC # BLD AUTO: 2.9 X10*3/UL (ref 4.4–11.3)

## 2025-03-03 PROCEDURE — 88237 TISSUE CULTURE BONE MARROW: CPT

## 2025-03-03 PROCEDURE — 99214 OFFICE O/P EST MOD 30 MIN: CPT | Mod: 25 | Performed by: INTERNAL MEDICINE

## 2025-03-03 PROCEDURE — 88185 FLOWCYTOMETRY/TC ADD-ON: CPT | Mod: TC

## 2025-03-03 PROCEDURE — 36415 COLL VENOUS BLD VENIPUNCTURE: CPT

## 2025-03-03 PROCEDURE — 99204 OFFICE O/P NEW MOD 45 MIN: CPT | Performed by: INTERNAL MEDICINE

## 2025-03-03 PROCEDURE — 85025 COMPLETE CBC W/AUTO DIFF WBC: CPT

## 2025-03-03 ASSESSMENT — ENCOUNTER SYMPTOMS
RESPIRATORY NEGATIVE: 1
EYES NEGATIVE: 1
CONSTITUTIONAL NEGATIVE: 1
PSYCHIATRIC NEGATIVE: 1
NEUROLOGICAL NEGATIVE: 1
GASTROINTESTINAL NEGATIVE: 1
ENDOCRINE NEGATIVE: 1
MUSCULOSKELETAL NEGATIVE: 1
HEMATOLOGIC/LYMPHATIC NEGATIVE: 1
CARDIOVASCULAR NEGATIVE: 1

## 2025-03-03 ASSESSMENT — PAIN SCALES - GENERAL: PAINLEVEL_OUTOF10: 0-NO PAIN

## 2025-03-03 NOTE — PROGRESS NOTES
Patient ID: Elmo Yap is a 59 y.o. male.  Referring Physician: Marisol Stringer MD  [address] [facility], [address]  [place] [address]  Primary Care Provider: Marisol Stirnger MD  Visit Type: Initial Visit      Subjective    HPI My white count has been low  Other than occasional left thumb stiffness, I don't have any inflammatory disorders    Review of Systems   Constitutional: Negative.    HENT:  Negative.     Eyes: Negative.    Respiratory: Negative.     Cardiovascular: Negative.    Gastrointestinal: Negative.    Endocrine: Negative.    Genitourinary: Negative.     Musculoskeletal: Negative.    Neurological: Negative.    Hematological: Negative.    Psychiatric/Behavioral: Negative.          Objective   BSA: 1.76 meters squared  /88 (BP Location: Right arm, Patient Position: Sitting, BP Cuff Size: Adult)   Pulse 71   Temp 35.2 °C (95.4 °F) (Temporal)   Resp 16   Wt 66.1 kg (145 lb 11.6 oz)   SpO2 97%   BMI 23.17 kg/m²      has a past medical history of H/O cervical spine x-ray (04/09/2024), H/O chest x-ray (05/09/2024), H/O CT scan, H/O CT scan of abdomen, H/O CT scan of abdomen (12/18/2024), H/O CT scan of brain, H/O CT scan of chest, H/O CT scan of chest (12/18/2024), H/O diagnostic ultrasound, H/O magnetic resonance imaging, H/O magnetic resonance imaging of brain and brain stem (03/20/2024), H/O magnetic resonance imaging of cervical spine, H/O magnetic resonance imaging of lumbar spine, and History of PFTs.   has a past surgical history that includes Bladder surgery (12/07/2018); Other surgical history (12/07/2018); Prostate biopsy (03/24/2020); Thyroidectomy (04/12/2021); Ureteroscopy (04/12/2021); Back surgery (03/24/2020); Colonoscopy (08/05/2020); Esophagogastroduodenoscopy (08/05/2020); Upper gastrointestinal endoscopy (10/05/2020); Lumbar laminectomy (08/14/2019); Cystoscopy (04/04/2022); and Cystoscopy (04/12/2024).  Family History   Problem Relation Name Age of Onset    No  Known Problems Mother          F: Diabetes, CAD/MI  ( 74) M Kidney stones, hypotension, OA S: Migraine, RA                          S:     B:                                Aunts/cousin with rectal CA; Aunt other side of family has rectal CA 2Aunt:  Colon Cancer     Oncology History    No history exists.       Elmo Yap  reports that he has never smoked. He has never used smokeless tobacco.  He  reports that he does not currently use alcohol.  He  reports no history of drug use.    Physical Exam  Vitals reviewed.   Constitutional:       Appearance: Normal appearance.   HENT:      Head: Normocephalic.      Mouth/Throat:      Mouth: Mucous membranes are moist.   Eyes:      Extraocular Movements: Extraocular movements intact.      Pupils: Pupils are equal, round, and reactive to light.   Cardiovascular:      Rate and Rhythm: Normal rate and regular rhythm.      Pulses: Normal pulses.      Heart sounds: Normal heart sounds.   Pulmonary:      Effort: Pulmonary effort is normal.      Breath sounds: Normal breath sounds.   Abdominal:      General: Bowel sounds are normal.      Palpations: Abdomen is soft.   Musculoskeletal:         General: Normal range of motion.      Cervical back: Normal range of motion and neck supple.   Skin:     General: Skin is warm.   Neurological:      General: No focal deficit present.      Mental Status: He is alert and oriented to person, place, and time.   Psychiatric:         Behavior: Behavior normal.         WBC   Date/Time Value Ref Range Status   2024 09:53 AM 3.0 (L) 4.4 - 11.3 x10*3/uL Final   2024 08:57 AM 2.9 (L) 4.4 - 11.3 x10*3/uL Final   2023 04:44 PM 3.3 (L) 4.4 - 11.3 x10E9/L Final   2022 10:03 AM 2.9 (L) 4.4 - 11.3 x10E9/L Final   10/11/2021 07:09 AM 3.2 (L) 4.4 - 11.3 x10E9/L Final     [facility]   Date Value Ref Range Status   2024 0.0 0.0 - 0.0 /100 WBCs Final   2024 0.0 0.0 - 0.0 /100 WBCs Final   2023 0.0 0.0 - 0.0 /100 WBC Final    04/01/2022 0.0 0.0 - 0.0 /100 WBC Final   10/11/2021 0.0 0.0 - 0.0 /100 WBC Final     RBC   Date Value Ref Range Status   12/05/2024 6.20 (H) 4.50 - 5.90 x10*6/uL Final   05/06/2024 5.94 (H) 4.50 - 5.90 x10*6/uL Final   04/18/2023 6.17 (H) 4.50 - 5.90 x10E12/L Final   04/01/2022 5.90 4.50 - 5.90 x10E12/L Final   10/11/2021 5.99 (H) 4.50 - 5.90 x10E12/L Final     Hemoglobin   Date Value Ref Range Status   12/05/2024 16.8 13.5 - 17.5 g/dL Final   05/06/2024 15.8 13.5 - 17.5 g/dL Final   04/18/2023 16.3 13.5 - 17.5 g/dL Final   04/01/2022 15.5 13.5 - 17.5 g/dL Final   10/11/2021 16.2 13.5 - 17.5 g/dL Final     Hematocrit   Date Value Ref Range Status   12/05/2024 51.5 41.0 - 52.0 % Final   05/06/2024 49.7 41.0 - 52.0 % Final   04/18/2023 51.6 41.0 - 52.0 % Final   04/01/2022 49.7 41.0 - 52.0 % Final   10/11/2021 50.3 41.0 - 52.0 % Final     MCV   Date/Time Value Ref Range Status   12/05/2024 09:53 AM 83 80 - 100 fL Final   05/06/2024 08:57 AM 84 80 - 100 fL Final   04/18/2023 04:44 PM 84 80 - 100 fL Final   04/01/2022 10:03 AM 84 80 - 100 fL Final   10/11/2021 07:09 AM 84 80 - 100 fL Final     MCH   Date/Time Value Ref Range Status   12/05/2024 09:53 AM 27.1 26.0 - 34.0 pg Final   05/06/2024 08:57 AM 26.6 26.0 - 34.0 pg Final     MCHC   Date/Time Value Ref Range Status   12/05/2024 09:53 AM 32.6 32.0 - 36.0 g/dL Final   05/06/2024 08:57 AM 31.8 (L) 32.0 - 36.0 g/dL Final   04/18/2023 04:44 PM 31.6 (L) 32.0 - 36.0 g/dL Final   04/01/2022 10:03 AM 31.2 (L) 32.0 - 36.0 g/dL Final   10/11/2021 07:09 AM 32.2 32.0 - 36.0 g/dL Final     RDW   Date/Time Value Ref Range Status   12/05/2024 09:53 AM 12.6 11.5 - 14.5 % Final   05/06/2024 08:57 AM 13.0 11.5 - 14.5 % Final   04/18/2023 04:44 PM 12.6 11.5 - 14.5 % Final   04/01/2022 10:03 AM 13.3 11.5 - 14.5 % Final   10/11/2021 07:09 AM 12.9 11.5 - 14.5 % Final     Platelets   Date/Time Value Ref Range Status   12/05/2024 09:53  150 - 450 x10*3/uL Final   05/06/2024 08:57  " 150 - 450 x10*3/uL Final   04/18/2023 04:44  150 - 450 x10E9/L Final   04/01/2022 10:03  150 - 450 x10E9/L Final   10/11/2021 07:09  150 - 450 x10E9/L Final     No results found for: \"MPV\"  Neutrophils %   Date/Time Value Ref Range Status   12/05/2024 09:53 AM 46.8 40.0 - 80.0 % Final   05/06/2024 08:57 AM 45.8 40.0 - 80.0 % Final   04/18/2023 04:44 PM 49.6 40.0 - 80.0 % Final   04/01/2022 10:03 AM 47.0 40.0 - 80.0 % Final   10/11/2021 07:09 AM 51.3 40.0 - 80.0 % Final     Immature Granulocytes %, Automated   Date/Time Value Ref Range Status   12/05/2024 09:53 AM 0.3 0.0 - 0.9 % Final     Comment:     Immature Granulocyte Count (IG) includes promyelocytes, myelocytes and metamyelocytes but does not include bands. Percent differential counts (%) should be interpreted in the context of the absolute cell counts (cells/UL).   05/06/2024 08:57 AM 0.0 0.0 - 0.9 % Final     Comment:     Immature Granulocyte Count (IG) includes promyelocytes, myelocytes and metamyelocytes but does not include bands. Percent differential counts (%) should be interpreted in the context of the absolute cell counts (cells/UL).   04/18/2023 04:44 PM 0.3 0.0 - 0.9 % Final     Comment:      Immature Granulocyte Count (IG) includes promyelocytes,    myelocytes and metamyelocytes but does not include bands.   Percent differential counts (%) should be interpreted in the   context of the absolute cell counts (cells/L).     04/01/2022 10:03 AM 0.3 0.0 - 0.9 % Final     Comment:      Immature Granulocyte Count (IG) includes promyelocytes,    myelocytes and metamyelocytes but does not include bands.   Percent differential counts (%) should be interpreted in the   context of the absolute cell counts (cells/L).     10/11/2021 07:09 AM 0.3 0.0 - 0.9 % Final     Comment:      Immature Granulocyte Count (IG) includes promyelocytes,    myelocytes and metamyelocytes but does not include bands.   Percent differential counts (%) should be " interpreted in the   context of the absolute cell counts (cells/L).       Lymphocytes %   Date/Time Value Ref Range Status   12/05/2024 09:53 AM 44.6 13.0 - 44.0 % Final   05/06/2024 08:57 AM 44.8 13.0 - 44.0 % Final   04/18/2023 04:44 PM 43.7 13.0 - 44.0 % Final   04/01/2022 10:03 AM 42.0 13.0 - 44.0 % Final   10/11/2021 07:09 AM 36.6 13.0 - 44.0 % Final     Monocytes %   Date/Time Value Ref Range Status   12/05/2024 09:53 AM 6.9 2.0 - 10.0 % Final   05/06/2024 08:57 AM 7.7 2.0 - 10.0 % Final   04/18/2023 04:44 PM 5.5 2.0 - 10.0 % Final   04/01/2022 10:03 AM 8.3 2.0 - 10.0 % Final   10/11/2021 07:09 AM 10.3 2.0 - 10.0 % Final     Eosinophils %   Date/Time Value Ref Range Status   12/05/2024 09:53 AM 0.7 0.0 - 6.0 % Final   05/06/2024 08:57 AM 1.0 0.0 - 6.0 % Final   04/18/2023 04:44 PM 0.3 0.0 - 6.0 % Final   04/01/2022 10:03 AM 1.7 0.0 - 6.0 % Final   10/11/2021 07:09 AM 0.9 0.0 - 6.0 % Final     Basophils %   Date/Time Value Ref Range Status   12/05/2024 09:53 AM 0.7 0.0 - 2.0 % Final   05/06/2024 08:57 AM 0.7 0.0 - 2.0 % Final   04/18/2023 04:44 PM 0.6 0.0 - 2.0 % Final   04/01/2022 10:03 AM 0.7 0.0 - 2.0 % Final   10/11/2021 07:09 AM 0.6 0.0 - 2.0 % Final     Neutrophils Absolute   Date/Time Value Ref Range Status   12/05/2024 09:53 AM 1.42 1.20 - 7.70 x10*3/uL Final     Comment:     Percent differential counts (%) should be interpreted in the context of the absolute cell counts (cells/uL).   05/06/2024 08:57 AM 1.31 1.20 - 7.70 x10*3/uL Final     Comment:     Percent differential counts (%) should be interpreted in the context of the absolute cell counts (cells/uL).   04/18/2023 04:44 PM 1.61 1.20 - 7.70 x10E9/L Final   04/01/2022 10:03 AM 1.35 1.20 - 7.70 x10E9/L Final   10/11/2021 07:09 AM 1.64 1.20 - 7.70 x10E9/L Final     Immature Granulocytes Absolute, Automated   Date/Time Value Ref Range Status   12/05/2024 09:53 AM 0.01 0.00 - 0.70 x10*3/uL Final   05/06/2024 08:57 AM 0.00 0.00 - 0.70 x10*3/uL Final  "    Lymphocytes Absolute   Date/Time Value Ref Range Status   12/05/2024 09:53 AM 1.35 1.20 - 4.80 x10*3/uL Final   05/06/2024 08:57 AM 1.28 1.20 - 4.80 x10*3/uL Final   04/18/2023 04:44 PM 1.42 1.20 - 4.80 x10E9/L Final   04/01/2022 10:03 AM 1.21 1.20 - 4.80 x10E9/L Final   10/11/2021 07:09 AM 1.17 (L) 1.20 - 4.80 x10E9/L Final     Monocytes Absolute   Date/Time Value Ref Range Status   12/05/2024 09:53 AM 0.21 0.10 - 1.00 x10*3/uL Final   05/06/2024 08:57 AM 0.22 0.10 - 1.00 x10*3/uL Final   04/18/2023 04:44 PM 0.18 0.10 - 1.00 x10E9/L Final   04/01/2022 10:03 AM 0.24 0.10 - 1.00 x10E9/L Final   10/11/2021 07:09 AM 0.33 0.10 - 1.00 x10E9/L Final     Eosinophils Absolute   Date/Time Value Ref Range Status   12/05/2024 09:53 AM 0.02 0.00 - 0.70 x10*3/uL Final   05/06/2024 08:57 AM 0.03 0.00 - 0.70 x10*3/uL Final   04/18/2023 04:44 PM 0.01 0.00 - 0.70 x10E9/L Final   04/01/2022 10:03 AM 0.05 0.00 - 0.70 x10E9/L Final   10/11/2021 07:09 AM 0.03 0.00 - 0.70 x10E9/L Final     Basophils Absolute   Date/Time Value Ref Range Status   12/05/2024 09:53 AM 0.02 0.00 - 0.10 x10*3/uL Final   05/06/2024 08:57 AM 0.02 0.00 - 0.10 x10*3/uL Final   04/18/2023 04:44 PM 0.02 0.00 - 0.10 x10E9/L Final   04/01/2022 10:03 AM 0.02 0.00 - 0.10 x10E9/L Final   10/11/2021 07:09 AM 0.02 0.00 - 0.10 x10E9/L Final       No components found for: \"PT\"  aPTT   Date/Time Value Ref Range Status   07/08/2019 01:41 PM 31 28 - 38 sec Final     Comment:       THE APTT IS NO LONGER USED FOR MONITORING     UNFRACTIONATED HEPARIN THERAPY.    FOR MONITORING HEPARIN THERAPY,     USE THE HEPARIN ASSAY.       Medication Documentation Review Audit       Reviewed by Judy Saul MA (Medical Assistant) on 03/03/25 at 1322      Medication Order Taking? Sig Documenting Provider Last Dose Status   cholecalciferol (Vitamin D-3) 25 MCG (1000 UT) tablet [id number] Yes Take 1 tablet (25 mcg) by mouth once daily. Historical Provider, MD  Active   diazePAM (Valium) 10 mg " tablet [id number]  Take 1 tablet (10 mg) by mouth every 6 hours if needed for muscle spasms for up to 7 days. Marisol Stringer MD   [id number]   ferrous sulfate 325 (65 Fe) MG tablet [id number] Yes Take 1 tablet (325 mg) by mouth 1 (one) time per week. Historical Provider, MD  Active   levothyroxine (Synthroid, Levoxyl) 125 mcg tablet [id number] Yes Take 1 tablet (125 mcg) by mouth once daily in the morning. Take before meals. Historical Provider, MD  Active   multivitamin tablet [id number] Yes Take 1 tablet by mouth once daily. Historical Provider, MD  Active   pantoprazole (ProtoNix) 20 mg EC tablet [id number] Yes Take 1 tablet (20 mg) by mouth once daily in the morning. Take before meals. Marisol Stringer MD  Active                   Assessment/Plan    1) leukopenia  -I reviewed his lab history in the EMR  -2019 wbc 8.7, hgb 17.1, plt 203,000  -2019 wbc 7.2, hgb 15.7, plt 187,000  -2019 wbc 5.7, hgb 11.7, plt 138,000  -2019 wbc 6.8, hgb 10.3, plt 131,000  -2019 wbc 6.5, hgb 11.5, plt 141,000  -2020 wbc 2.9, hgb 14.7, plt 196,000, ANC 1260, abs lymph 1260, rest of diff normal, collected 1117 AM, run 1610 PM  -2020 wbc 3.1, hgb 13.7, plt 179,000, ANC 1500, abs lymph 1180, rest of diff normal, collected 1001 AM, run 1510 PM  -2021 wbc 3.1, hgb 16.4, plt 192,000, ANC 1200, abs lymph 1590, rest of diff normal, collected 1343 PM, run 1349 PM  -2021 wbc 5.2, hgb 14.1, plt 193,000  -10/11/2021 wbc 3.2, hgb 16.2, plt 168,000, ANC 1640, abs lymph 1170, rest of diff normal, collected 709 AM, run 735 AM  -2022 wbc 2.9, hgb 15.5, plt 161,000, ANC 1350, abs lymph 1210, rest of diff normal, collected 1003 AM, run 1029 AM  -2023 wbc 3.3, hgb 16.3, plt 183,000ANC 1610, abs lymph 1420, rest of diff normal, collected 1644 PM, run 1716 PM  -2023 wbc 4.09, hgb 15.1, plt 193,000  -2023 wbc 6.06, hgb 15.2, plt 138,000  -2023 wbc 4.85, hgb 13.8, plt 140,000  -2024 wbc  "2.9, hgb 15.8, plt 226,000, ANC 1310, abs lymph 1280, rest of diff normal, collected 857 AM, run 1212 PM   -12/5/2024 wbc 3.0, hgb 16.8, plt 183,000, ANC 1420, abs lymph 1350, rest of diff normal, collected 953 AM, run 1258 PM, pathologist review \"atypical lymphocytes and large granular lymphocytes are present; no blasts or nucleated red blood cells\"  -explained to Elmo that the differential is much more informative than the total WBC  -a couple of the components of the differentially, specifically the eosinophils and basophils, are normally in such low numbers in the circulation that even 0.0 is included as part of the normal reference range; as long as all 5 components of the differential are in normal range, it matters less what the total WBC is, as sometimes the total WBC can be flagged as low as it is simply the sum total of the 5 components of the differential  -many of his CBCs are characterized by lengthy lag times between when the specimen is collected and when it is finally run, as this can lead to settling of WBCs at the bottom of the tube, as the needle of the CBC machine that pierces the stopper of the tube to  a droplet of blood does so from only just below the fill line  -will first run CBC/diff freshly  -if in fact he has a component of the differential on the low side--we will then proceed with flow cytometry    2) vitamin D deficiency  -on vitamin D supplement    3) history of thyroid cancer  -diagnosed with papillary thyroid cancer in 2004  -underwent total thyroidectomy  -pathology showed 2 foci of PTC within thyroid  -s/p CAMP ablation in [id number]  -CAMP is typically associated with transient lymphocytopenia after completion of course, however, lymphocytopenia can persist in some  -on levothyroxine    4) GERD  -on protonix     Problem List Items Addressed This Visit             ICD-10-CM    Leukopenia D72.819    Relevant Orders    Flow, Path Review & Reflex Genetics, Blood    Clinic " Appointment Request Other (comment) (Telephone followup); JOAQUIM TOLENTINO; [id number]     Other Visit Diagnoses         Codes    Weight loss, unintentional     R63.4    ? due topathology vs overactive thyroid (TSH is lowest it has been in 3 years)  Get labs /CTs  If (-) would decrease thyroid med dose a bit if Ok w/endo     Fatigue, unspecified type     R53.83    Atypical lymphocytes present on peripheral blood smear     D72.89                 Joaquim Tolentino MD

## 2025-03-06 LAB
CELL COUNT (BLOOD): 12 X10*3/UL
CELL POPULATIONS: NORMAL
CHROM ANALY OVERALL INTERP-IMP: NORMAL
CYTOGENETICS/MOLECULAR TEST ORDERED: NO
DIAGNOSIS: NORMAL
FLOW DIFFERENTIAL: NORMAL
FLOW TEST ORDERED: NORMAL
LAB TEST METHOD: NORMAL
NUMBER OF CELLS COLLECTED: NORMAL
PATH REPORT.TOTAL CANCER: NORMAL
RBC MORPH BLD: NORMAL
SIGNATURE COMMENT: NORMAL
SPECIMEN VIABILITY: NORMAL
WBC MORPH BLD: NORMAL

## 2025-03-18 ENCOUNTER — TELEMEDICINE (OUTPATIENT)
Dept: HEMATOLOGY/ONCOLOGY | Facility: CLINIC | Age: 60
End: 2025-03-18
Payer: COMMERCIAL

## 2025-03-18 DIAGNOSIS — Z85.850 HISTORY OF THYROID CANCER: ICD-10-CM

## 2025-03-18 DIAGNOSIS — K21.9 CHRONIC GERD: ICD-10-CM

## 2025-03-18 DIAGNOSIS — D72.819 LEUKOPENIA, UNSPECIFIED TYPE: Primary | ICD-10-CM

## 2025-03-18 DIAGNOSIS — E55.9 VITAMIN D DEFICIENCY: ICD-10-CM

## 2025-03-18 DIAGNOSIS — E03.9 HYPOTHYROIDISM, ADULT: ICD-10-CM

## 2025-03-18 NOTE — PROGRESS NOTES
Patient ID: Elmo Yap is a 59 y.o. male.  Referring Physician: Joaquim Tolentino MD  [address] [address]  [place] [address]  Primary Care Provider: Marisol Stringer MD  Visit Type:  Follow Up     Verbal consent was requested and obtained from patient on this date for a telehealth visit.    Subjective    HPI How was my labwork?    Review of Systems   Constitutional: Negative.    HENT:  Negative.     Eyes: Negative.    Respiratory: Negative.     Cardiovascular: Negative.    Gastrointestinal: Negative.    Endocrine: Negative.    Genitourinary: Negative.     Musculoskeletal: Negative.    Skin: Negative.    Neurological: Negative.    Hematological: Negative.    Psychiatric/Behavioral: Negative.          Objective   BSA: There is no height or weight on file to calculate BSA.  There were no vitals taken for this visit.     has a past medical history of H/O cervical spine x-ray (2024), H/O chest x-ray (2024), H/O CT scan, H/O CT scan of abdomen, H/O CT scan of abdomen (2024), H/O CT scan of brain, H/O CT scan of chest, H/O CT scan of chest (2024), H/O diagnostic ultrasound, H/O magnetic resonance imaging, H/O magnetic resonance imaging of brain and brain stem (2024), H/O magnetic resonance imaging of cervical spine, H/O magnetic resonance imaging of lumbar spine, and History of PFTs.   has a past surgical history that includes Bladder surgery (2018); Other surgical history (2018); Prostate biopsy (2020); Thyroidectomy (2021); Ureteroscopy (2021); Back surgery (2020); Colonoscopy (2020); Esophagogastroduodenoscopy (2020); Upper gastrointestinal endoscopy (10/05/2020); Lumbar laminectomy (2019); Cystoscopy (2022); and Cystoscopy (2024).  Family History   Problem Relation Name Age of Onset    No Known Problems Mother          F: Diabetes, CAD/MI  ( 74) M Kidney stones, hypotension, OA S: Migraine, RA                           S:     B:                                Aunts/cousin with rectal CA; Aunt other side of family has rectal CA 2Aunt:  Colon Cancer     Oncology History    No history exists.       Elmo Yap  reports that he has never smoked. He has never used smokeless tobacco.  He  reports that he does not currently use alcohol.  He  reports no history of drug use.    Physical Exam    WBC   Date/Time Value Ref Range Status   03/03/2025 01:55 PM 2.9 (L) 4.4 - 11.3 x10*3/uL Final   12/05/2024 09:53 AM 3.0 (L) 4.4 - 11.3 x10*3/uL Final   05/06/2024 08:57 AM 2.9 (L) 4.4 - 11.3 x10*3/uL Final     nRBC   Date Value Ref Range Status   12/05/2024 0.0 0.0 - 0.0 /100 WBCs Final   05/06/2024 0.0 0.0 - 0.0 /100 WBCs Final   04/18/2023 0.0 0.0 - 0.0 /100 WBC Final   04/01/2022 0.0 0.0 - 0.0 /100 WBC Final   10/11/2021 0.0 0.0 - 0.0 /100 WBC Final     RBC   Date Value Ref Range Status   03/03/2025 6.25 (H) 4.50 - 5.90 x10*6/uL Final   12/05/2024 6.20 (H) 4.50 - 5.90 x10*6/uL Final   05/06/2024 5.94 (H) 4.50 - 5.90 x10*6/uL Final     Hemoglobin   Date Value Ref Range Status   03/03/2025 16.9 13.5 - 17.5 g/dL Final   12/05/2024 16.8 13.5 - 17.5 g/dL Final   05/06/2024 15.8 13.5 - 17.5 g/dL Final     Hematocrit   Date Value Ref Range Status   03/03/2025 52.9 (H) 41.0 - 52.0 % Final   12/05/2024 51.5 41.0 - 52.0 % Final   05/06/2024 49.7 41.0 - 52.0 % Final     MCV   Date/Time Value Ref Range Status   03/03/2025 01:55 PM 85 80 - 100 fL Final   12/05/2024 09:53 AM 83 80 - 100 fL Final   05/06/2024 08:57 AM 84 80 - 100 fL Final     MCH   Date/Time Value Ref Range Status   03/03/2025 01:55 PM 27.0 26.0 - 34.0 pg Final   12/05/2024 09:53 AM 27.1 26.0 - 34.0 pg Final   05/06/2024 08:57 AM 26.6 26.0 - 34.0 pg Final     MCHC   Date/Time Value Ref Range Status   03/03/2025 01:55 PM 31.9 (L) 32.0 - 36.0 g/dL Final   12/05/2024 09:53 AM 32.6 32.0 - 36.0 g/dL Final   05/06/2024 08:57 AM 31.8 (L) 32.0 - 36.0 g/dL Final     RDW   Date/Time Value Ref  "Range Status   03/03/2025 01:55 PM 13.3 11.5 - 14.5 % Final   12/05/2024 09:53 AM 12.6 11.5 - 14.5 % Final   05/06/2024 08:57 AM 13.0 11.5 - 14.5 % Final     Platelets   Date/Time Value Ref Range Status   03/03/2025 01:55  150 - 450 x10*3/uL Final   12/05/2024 09:53  150 - 450 x10*3/uL Final   05/06/2024 08:57  150 - 450 x10*3/uL Final     No results found for: \"MPV\"  Neutrophils %   Date/Time Value Ref Range Status   03/03/2025 01:55 PM 51.7 40.0 - 80.0 % Final   12/05/2024 09:53 AM 46.8 40.0 - 80.0 % Final   05/06/2024 08:57 AM 45.8 40.0 - 80.0 % Final     Immature Granulocytes %, Automated   Date/Time Value Ref Range Status   03/03/2025 01:55 PM 0.3 0.0 - 0.9 % Final     Comment:     Immature Granulocyte Count (IG) includes promyelocytes, myelocytes and metamyelocytes but does not include bands. Percent differential counts (%) should be interpreted in the context of the absolute cell counts (cells/UL).   12/05/2024 09:53 AM 0.3 0.0 - 0.9 % Final     Comment:     Immature Granulocyte Count (IG) includes promyelocytes, myelocytes and metamyelocytes but does not include bands. Percent differential counts (%) should be interpreted in the context of the absolute cell counts (cells/UL).   05/06/2024 08:57 AM 0.0 0.0 - 0.9 % Final     Comment:     Immature Granulocyte Count (IG) includes promyelocytes, myelocytes and metamyelocytes but does not include bands. Percent differential counts (%) should be interpreted in the context of the absolute cell counts (cells/UL).     Lymphocytes %   Date/Time Value Ref Range Status   03/03/2025 01:55 PM 39.0 13.0 - 44.0 % Final   12/05/2024 09:53 AM 44.6 13.0 - 44.0 % Final   05/06/2024 08:57 AM 44.8 13.0 - 44.0 % Final     Monocytes %   Date/Time Value Ref Range Status   03/03/2025 01:55 PM 6.6 2.0 - 10.0 % Final   12/05/2024 09:53 AM 6.9 2.0 - 10.0 % Final   05/06/2024 08:57 AM 7.7 2.0 - 10.0 % Final     Eosinophils %   Date/Time Value Ref Range Status "   03/03/2025 01:55 PM 1.0 0.0 - 6.0 % Final   12/05/2024 09:53 AM 0.7 0.0 - 6.0 % Final   05/06/2024 08:57 AM 1.0 0.0 - 6.0 % Final     Basophils %   Date/Time Value Ref Range Status   03/03/2025 01:55 PM 1.4 0.0 - 2.0 % Final   12/05/2024 09:53 AM 0.7 0.0 - 2.0 % Final   05/06/2024 08:57 AM 0.7 0.0 - 2.0 % Final     Neutrophils Absolute   Date/Time Value Ref Range Status   03/03/2025 01:55 PM 1.50 1.20 - 7.70 x10*3/uL Final     Comment:     Percent differential counts (%) should be interpreted in the context of the absolute cell counts (cells/uL).   12/05/2024 09:53 AM 1.42 1.20 - 7.70 x10*3/uL Final     Comment:     Percent differential counts (%) should be interpreted in the context of the absolute cell counts (cells/uL).   05/06/2024 08:57 AM 1.31 1.20 - 7.70 x10*3/uL Final     Comment:     Percent differential counts (%) should be interpreted in the context of the absolute cell counts (cells/uL).     Immature Granulocytes Absolute, Automated   Date/Time Value Ref Range Status   03/03/2025 01:55 PM 0.01 0.00 - 0.70 x10*3/uL Final   12/05/2024 09:53 AM 0.01 0.00 - 0.70 x10*3/uL Final   05/06/2024 08:57 AM 0.00 0.00 - 0.70 x10*3/uL Final     Lymphocytes Absolute   Date/Time Value Ref Range Status   03/03/2025 01:55 PM 1.13 (L) 1.20 - 4.80 x10*3/uL Final   12/05/2024 09:53 AM 1.35 1.20 - 4.80 x10*3/uL Final   05/06/2024 08:57 AM 1.28 1.20 - 4.80 x10*3/uL Final     Monocytes Absolute   Date/Time Value Ref Range Status   03/03/2025 01:55 PM 0.19 0.10 - 1.00 x10*3/uL Final   12/05/2024 09:53 AM 0.21 0.10 - 1.00 x10*3/uL Final   05/06/2024 08:57 AM 0.22 0.10 - 1.00 x10*3/uL Final     Eosinophils Absolute   Date/Time Value Ref Range Status   03/03/2025 01:55 PM 0.03 0.00 - 0.70 x10*3/uL Final   12/05/2024 09:53 AM 0.02 0.00 - 0.70 x10*3/uL Final   05/06/2024 08:57 AM 0.03 0.00 - 0.70 x10*3/uL Final     Basophils Absolute   Date/Time Value Ref Range Status   03/03/2025 01:55 PM 0.04 0.00 - 0.10 x10*3/uL Final  "  2024 09:53 AM 0.02 0.00 - 0.10 x10*3/uL Final   2024 08:57 AM 0.02 0.00 - 0.10 x10*3/uL Final       No components found for: \"PT\"  aPTT   Date/Time Value Ref Range Status   2019 01:41 PM 31 28 - 38 sec Final     Comment:       THE APTT IS NO LONGER USED FOR MONITORING     UNFRACTIONATED HEPARIN THERAPY.    FOR MONITORING HEPARIN THERAPY,     USE THE HEPARIN ASSAY.       Medication Documentation Review Audit       Reviewed by Joaquim Tolentino MD (Physician) on 25 at 2119      Medication Order Taking? Sig Documenting Provider Last Dose Status   cholecalciferol (Vitamin D-3) 25 MCG (1000 UT) tablet [id number] Yes Take 1 tablet (25 mcg) by mouth once daily. Historical Provider, MD  Active   diazePAM (Valium) 10 mg tablet [id number]  Take 1 tablet (10 mg) by mouth every 6 hours if needed for muscle spasms for up to 7 days. Marisol Stringer MD   24 2359   ferrous sulfate 325 (65 Fe) MG tablet [id number] Yes Take 1 tablet (325 mg) by mouth 1 (one) time per week. Historical Provider, MD  Active   levothyroxine (Synthroid, Levoxyl) 125 mcg tablet [id number] Yes Take 1 tablet (125 mcg) by mouth once daily in the morning. Take before meals. Historical Provider, MD  Active   multivitamin tablet [id number] Yes Take 1 tablet by mouth once daily. Historical Provider, MD  Active   pantoprazole (ProtoNix) 20 mg EC tablet [id number] Yes Take 1 tablet (20 mg) by mouth once daily in the morning. Take before meals. Marisol Stringer MD  Active                   Assessment/Plan    1) leukopenia  -I reviewed his lab history in the EMR  -2019 wbc 8.7, hgb 17.1, plt 203,000  -2019 wbc 7.2, hgb 15.7, plt 187,000  -2019 wbc 5.7, hgb 11.7, plt 138,000  -2019 wbc 6.8, hgb 10.3, plt 131,000  -2019 wbc 6.5, hgb 11.5, plt 141,000  -2020 wbc 2.9, hgb 14.7, plt 196,000, ANC 1260, abs lymph 1260, rest of diff normal, collected 1117 AM, run 1610 PM  -2020 wbc 3.1, hgb 13.7, plt 179,000, ANC " "1500, abs lymph 1180, rest of diff normal, collected 1001 AM, run 1510 PM  -1/1/2021 wbc 3.1, hgb 16.4, plt 192,000, ANC 1200, abs lymph 1590, rest of diff normal, collected 1343 PM, run 1349 PM  -1/2/2021 wbc 5.2, hgb 14.1, plt 193,000  -10/11/2021 wbc 3.2, hgb 16.2, plt 168,000, ANC 1640, abs lymph 1170, rest of diff normal, collected 709 AM, run 735 AM  -4/1/2022 wbc 2.9, hgb 15.5, plt 161,000, ANC 1350, abs lymph 1210, rest of diff normal, collected 1003 AM, run 1029 AM  -4/18/2023 wbc 3.3, hgb 16.3, plt 183,000ANC 1610, abs lymph 1420, rest of diff normal, collected 1644 PM, run 1716 PM  -7/14/2023 wbc 4.09, hgb 15.1, plt 193,000  -7/21/2023 wbc 6.06, hgb 15.2, plt 138,000  -7/22/2023 wbc 4.85, hgb 13.8, plt 140,000  -5/6/2024 wbc 2.9, hgb 15.8, plt 226,000, ANC 1310, abs lymph 1280, rest of diff normal, collected 857 AM, run 1212 PM   -12/5/2024 wbc 3.0, hgb 16.8, plt 183,000, ANC 1420, abs lymph 1350, rest of diff normal, collected 953 AM, run 1258 PM, pathologist review \"atypical lymphocytes and large granular lymphocytes are present; no blasts or nucleated red blood cells\"  -explained to Elmo that the differential is much more informative than the total WBC  -a couple of the components of the differentially, specifically the eosinophils and basophils, are normally in such low numbers in the circulation that even 0.0 is included as part of the normal reference range; as long as all 5 components of the differential are in normal range, it matters less what the total WBC is, as sometimes the total WBC can be flagged as low as it is simply the sum total of the 5 components of the differential  -many of his CBCs are characterized by lengthy lag times between when the specimen is collected and when it is finally run, as this can lead to settling of WBCs at the bottom of the tube, as the needle of the CBC machine that pierces the stopper of the tube to  a droplet of blood does so from only just below the " fill line  -will first run CBC/diff freshly  -if in fact he has a component of the differential on the low side--we will then proceed with flow cytometry  -here for interval followup via telephone  -CBC run freshly in office showed wbc 2.9, hgb 16.9, plt 196,000, ANC 1500, abs lymph 1130, abs monos 190, abs eos 30, abs basos 40  -on occasion he has had borderline lymphopenia  -flow cytometry: no immunophenotypic evidence of a lymphoproliferative disorder; no increased or abnormal blast population; no definitive abnormality of granulocytes, monocytes  -most likely his occasional borderline lymphocytopenia is a consequence of his having received CAMP in the past as treatment for thyroid cancer  -this is generally of no clinical consequence as flow cytometry was completely negative, and there is nothing that needs to be done about it  -offered to follow him annually vs return PRN     2) vitamin D deficiency  -on vitamin D supplement     3) history of thyroid cancer  -diagnosed with papillary thyroid cancer in 2004  -underwent total thyroidectomy  -pathology showed 2 foci of PTC within thyroid  -s/p CAMP ablation in [id number]  -CAMP is typically associated with transient lymphocytopenia after completion of course, however, lymphocytopenia can persist in some  -on levothyroxine     4) GERD  -on protonix          Problem List Items Addressed This Visit             ICD-10-CM    Leukopenia D72.819            Joaquim Tolentino MD

## 2025-03-20 ASSESSMENT — ENCOUNTER SYMPTOMS
CONSTITUTIONAL NEGATIVE: 1
MUSCULOSKELETAL NEGATIVE: 1
EYES NEGATIVE: 1
ENDOCRINE NEGATIVE: 1
NEUROLOGICAL NEGATIVE: 1
RESPIRATORY NEGATIVE: 1
PSYCHIATRIC NEGATIVE: 1
CARDIOVASCULAR NEGATIVE: 1
GASTROINTESTINAL NEGATIVE: 1
HEMATOLOGIC/LYMPHATIC NEGATIVE: 1

## 2025-05-12 RX ORDER — LINEZOLID 600 MG/1
600 TABLET, FILM COATED ORAL 2 TIMES DAILY
COMMUNITY
Start: 2025-05-02 | End: 2025-05-13 | Stop reason: ALTCHOICE

## 2025-05-13 ENCOUNTER — APPOINTMENT (OUTPATIENT)
Dept: PRIMARY CARE | Facility: CLINIC | Age: 60
End: 2025-05-13
Payer: COMMERCIAL

## 2025-05-13 VITALS
OXYGEN SATURATION: 97 % | DIASTOLIC BLOOD PRESSURE: 70 MMHG | TEMPERATURE: 97.4 F | BODY MASS INDEX: 22.91 KG/M2 | HEART RATE: 73 BPM | WEIGHT: 146 LBS | SYSTOLIC BLOOD PRESSURE: 114 MMHG | HEIGHT: 67 IN

## 2025-05-13 DIAGNOSIS — N40.0 BENIGN PROSTATIC HYPERPLASIA, UNSPECIFIED WHETHER LOWER URINARY TRACT SYMPTOMS PRESENT: ICD-10-CM

## 2025-05-13 DIAGNOSIS — E55.9 VITAMIN D DEFICIENCY: ICD-10-CM

## 2025-05-13 DIAGNOSIS — Z86.0100 HISTORY OF COLON POLYPS: ICD-10-CM

## 2025-05-13 DIAGNOSIS — Z85.850 HISTORY OF THYROID CANCER: ICD-10-CM

## 2025-05-13 DIAGNOSIS — K64.9 HEMORRHOIDS, UNSPECIFIED HEMORRHOID TYPE: ICD-10-CM

## 2025-05-13 DIAGNOSIS — E61.1 IRON DEFICIENCY: ICD-10-CM

## 2025-05-13 DIAGNOSIS — D22.9 SUSPICIOUS NEVUS: ICD-10-CM

## 2025-05-13 DIAGNOSIS — G44.86 CERVICOGENIC HEADACHE: ICD-10-CM

## 2025-05-13 DIAGNOSIS — Z00.00 ROUTINE ADULT HEALTH MAINTENANCE: Primary | Chronic | ICD-10-CM

## 2025-05-13 DIAGNOSIS — Z88.0 PENICILLIN ALLERGY: ICD-10-CM

## 2025-05-13 DIAGNOSIS — E03.9 HYPOTHYROIDISM, ADULT: ICD-10-CM

## 2025-05-13 DIAGNOSIS — D72.819 LEUKOPENIA, UNSPECIFIED TYPE: ICD-10-CM

## 2025-05-13 DIAGNOSIS — G44.219 TTH (TENSION-TYPE HEADACHE), INFREQUENT EPISODIC TYPE: ICD-10-CM

## 2025-05-13 DIAGNOSIS — K21.9 CHRONIC GERD: ICD-10-CM

## 2025-05-13 DIAGNOSIS — R91.8 LUNG MASS: ICD-10-CM

## 2025-05-13 DIAGNOSIS — Z85.51 HISTORY OF BLADDER CANCER: ICD-10-CM

## 2025-05-13 PROCEDURE — 99396 PREV VISIT EST AGE 40-64: CPT | Performed by: INTERNAL MEDICINE

## 2025-05-13 PROCEDURE — 3008F BODY MASS INDEX DOCD: CPT | Performed by: INTERNAL MEDICINE

## 2025-05-13 PROCEDURE — 1036F TOBACCO NON-USER: CPT | Performed by: INTERNAL MEDICINE

## 2025-05-13 RX ORDER — HYDROCORTISONE 25 MG/G
CREAM TOPICAL 4 TIMES DAILY PRN
Qty: 30 G | Refills: 5 | Status: SHIPPED | OUTPATIENT
Start: 2025-05-13 | End: 2026-05-13

## 2025-05-13 RX ORDER — PANTOPRAZOLE SODIUM 20 MG/1
20 TABLET, DELAYED RELEASE ORAL 2 TIMES DAILY
Qty: 180 TABLET | Refills: 3 | Status: SHIPPED | OUTPATIENT
Start: 2025-05-13

## 2025-05-13 NOTE — ASSESSMENT & PLAN NOTE
Annual Wellness exam completed   Preventive Health History reviewed  Ordered:   Labs    Cscope   Shingrix discussed

## 2025-05-13 NOTE — PROGRESS NOTES
ANNUAL CPE VISIT  Chief Complaint   Patient presents with    Annual Exam     HPI: Reviewed chart/medical care received since last seen by me.    Reducing thyroid med dose solved the weight loss issue  Still having his issues with his trap muscle/Has    Had cystoscopy 4/25 :  Cystoscopy - normal   Urethra - Normal  VN - open  Prostate: Moderate lateral lobes, TUR defect with regrowth  Urothelium: Normal, no evidence of tumor, CIS stone, foreign body, diverticuli, etc.   Trabeculation: mild  Inflammation: mild  Ureteral Orifices: Normal, clear efflux bilaterally  Trigone: Normal  MARI- 60 grams no nodules     Got infection afterwards  Per Urology note (copied)  Enterococcus UTI  Plan:  Start linezolid given his PCN allergy  Repeat urine culture  Followu p 3 weeks for urine recheck.     Saw Hematolopgy 3/25 (Copied)  -CBC run freshly in office showed wbc 2.9, hgb 16.9, plt 196,000, ANC 1500, abs lymph 1130, abs monos 190, abs eos 30, abs basos 40  -on occasion he has had borderline lymphopenia  -flow cytometry: no immunophenotypic evidence of a lymphoproliferative disorder; no increased or abnormal blast population; no definitive abnormality of granulocytes, monocytes  -most likely his occasional borderline lymphocytopenia is a consequence of his having received CAMP in the past as treatment for thyroid cancer  -this is generally of no clinical consequence as flow cytometry was completely negative, and there is nothing that needs to be done about it  -offered to follow him annually vs return PRN    Getting Botox for his headaches:  Injection Sites Left (Units) Left (Sites) Right (Units) Right (Sites) TOTAL (Units)    0 0 0 0 0   Procerus Units: 0 Sites: 0 0   Frontalis 0 0 0 0 0  Temporalis 20 4 20 4 40   Occipitalis 15 3 15 3 30   Cervical PSP 10 2 10 2 20   Trapezius   optional follow the pain 15   5 3   1 15   5 3   1 40   Rhomboids 10 2 10     Total Units used: 140   Total Units wasted: 60     If Botox  doesn't work - going to try 3 total to see   Then will explore nerve blocks  Saw Pain mgmt 2/25 (copied)  # Cervical facet syndrome (M47.812)  # Cervicogenic headache (G44.86)  # Cervicalgia (M54.2)  Chronic left sided neck pain radiating to the head s/p TPIs, consistent with cervicogenic headache. Previous treatments include trigger point injections, acupuncture, chiropractic care, and physical therapy with limited relief. Denied for Botox treatment. Physical exam reveals tenderness of left upper cervical facets with positive facet loading on the left side. Neurological exam shows normal strength and  bilaterally.  - Discussed the potential benefits of cervical facet medial branch nerve blocks followed by radiofrequency ablation (RFA) if successful  - Ordered left C2-3, C3-4 facet mbbs   - Recommended trial of duloxetine (Cymbalta) for chronic musculoskeletal and nerve pain; patient to review information and consider initiation.  - Discussed the role of psychotherapy in chronic pain management; offered referral to a pain psychologist within the department - deferred for now   - Patient to continue current physical therapy regimen.  - the patient will be called in a few days to determine the efficacy of these blocks and be scheduled accordingly for the upcoming second diagnostic block or RFA    Saw Rheum and had TP- helps 4-6 weeks  (Copied)  Exam revealed taut band localized to the left upper trap, levator scapula and rhomboids. Neuro exam, again, was unchanged. Trigger points secondary to myofascial pain were identified in the posterior neck and upper back. I discussed repeating trigger point injections in the office today given the substantial pain relief he experiences after the injection.     Saw Pulm 1/25 C(opied  Impression:  Elmo Yap is a 59 year old Black male with relevant PMH of bladder and thyroid cancer. He is presenting to clinic today with a finding of a 7mm cystic nodule in his RUL in  the setting of unintentional weight loss, fatigue and neutropenia   Plan:   - Patient can follow up with surveillance CT, no procedural intervention is required at this time.     C/o GERD symptoms   Got worse with Linezolid  On PPI 20mg    BP at home 12/80    Labs reviewed:  Component      Latest Ref Rng 12/5/2024   WBC      4.4 - 11.3 x10*3/uL 3.0 (L)    nRBC      0.0 - 0.0 /100 WBCs 0.0    RBC      4.50 - 5.90 x10*6/uL 6.20 (H)    HEMOGLOBIN      13.5 - 17.5 g/dL 16.8    HEMATOCRIT      41.0 - 52.0 % 51.5    MCV      80 - 100 fL 83    MCH      26.0 - 34.0 pg 27.1    MCHC      32.0 - 36.0 g/dL 32.6    RED CELL DISTRIBUTION WIDTH      11.5 - 14.5 % 12.6    Platelets      150 - 450 x10*3/uL 183    Neutrophils %      40.0 - 80.0 % 46.8    Immature Granulocytes %, Automated      0.0 - 0.9 % 0.3    Lymphocytes %      13.0 - 44.0 % 44.6    Monocytes %      2.0 - 10.0 % 6.9    Eosinophils %      0.0 - 6.0 % 0.7    Basophils %      0.0 - 2.0 % 0.7    Neutrophils Absolute      1.20 - 7.70 x10*3/uL 1.42    Immature Granulocytes Absolute, Automated      0.00 - 0.70 x10*3/uL 0.01    Lymphocytes Absolute      1.20 - 4.80 x10*3/uL 1.35    Monocytes Absolute      0.10 - 1.00 x10*3/uL 0.21    Eosinophils Absolute      0.00 - 0.70 x10*3/uL 0.02    Basophils Absolute      0.00 - 0.10 x10*3/uL 0.02    GLUCOSE      74 - 99 mg/dL 94    SODIUM      136 - 145 mmol/L 139    POTASSIUM      3.5 - 5.3 mmol/L 4.2    CHLORIDE      98 - 107 mmol/L 104    Bicarbonate      21 - 32 mmol/L 30    Anion Gap      10 - 20 mmol/L 9 (L)    Blood Urea Nitrogen      6 - 23 mg/dL 8    Creatinine      0.50 - 1.30 mg/dL 0.73    EGFR      >60 mL/min/1.73m*2 >90    Calcium      8.6 - 10.3 mg/dL 9.3    Albumin      3.4 - 5.0 g/dL 4.4    Alkaline Phosphatase      33 - 120 U/L 91    Total Protein      6.4 - 8.2 g/dL 6.9    AST      9 - 39 U/L 18    Bilirubin Total      0.0 - 1.2 mg/dL 1.7 (H)    ALT      10 - 52 U/L 22    Color, Urine      Light-Yellow, Yellow,  Dark-Yellow  Yellow    Appearance, Urine      Clear  Ex.Turbid ! (N)    Specific Gravity, Urine      1.005 - 1.035  1.023    pH, Urine      5.0, 5.5, 6.0, 6.5, 7.0, 7.5, 8.0  7.5    Protein, Urine      NEGATIVE, 10 (TRACE), 20 (TRACE) mg/dL 20 (TRACE)    Glucose, Urine      Normal mg/dL Normal    Blood, Urine      NEGATIVE  NEGATIVE    Ketones, Urine      NEGATIVE mg/dL NEGATIVE    Bilirubin, Urine      NEGATIVE  NEGATIVE    Urobilinogen, Urine      Normal mg/dL Normal    Nitrite, Urine      NEGATIVE  NEGATIVE    Leukocyte Esterase, Urine      NEGATIVE  NEGATIVE    WBC, Urine      1-5, NONE /HPF 1-5    WBC Clumps, Urine      Reference range not established. /HPF FEW    RBC, Urine      NONE, 1-2, 3-5 /HPF 3-5    Squamous Epithelial Cells, Urine      Reference range not established. /HPF 1-9 (SPARSE)    Mucus, Urine      Reference range not established. /LPF 4+    Thyroid Stimulating Hormone      0.44 - 3.98 mIU/L 0.01 (L)    Sed Rate      0 - 20 mm/h 10    C-Reactive Protein      <1.00 mg/dL 0.16    Thyroxine, Free      0.61 - 1.12 ng/dL 1.74 (H)    Pathologist Review-CBC Differential Atypical lymphocytes and large granular lymphocytes are present in the peripheral blood smear.…    Cytomegalovirus IgG      Nonreactive  Reactive !    HIV 1/2 Antigen/Antibody Screen with Reflex to Confirmation      Nonreactive  Nonreactive    Mononucleosis Screen      Negative  Negative         Assessment and Plan:  Problem List Items Addressed This Visit          High    Routine adult health maintenance - Primary (Chronic)    Overview   TDaP 3/7/18  COVID-19 Moderna 3/10/21, 4/15/21, 11/18/21, 4/25/22, 12/9/22  FLu vaccine 2018, 10/1/19, 10/1/20, 10/1/22  Cscope 2015 (5yrs); 8/2020 (5 y)  6/29/20, PSA 3.9, 3.58 9/3/18; 1.56 4/9/21, 1.79 4/1/22 ; 4/21/23 3.6, 5/8/24 5.1; 8/22/24: 1.95         Current Assessment & Plan   Annual Wellness exam completed   Preventive Health History reviewed  Ordered:   Labs    Cscope   Shingrix discussed             Medium    BPH (benign prostatic hyperplasia)    Overview   with elevated PSA  Has seen Urology at [facility]  CT abd 12/24: The prostate gland is heterogeneous and enlarged measuring 5.0 cm   On Flomax in past(dizziness, ED) until changed to Uroxatral in 10/21  9/18:  Prostate, left base, biopsy (A) - Benign prostate tissue.  S/p green light laser July 2023 (complicated by infection)  Urology managing         Cervicogenic headache    Relevant Orders    Referral to Chiropractic Medicine - (External)    Chronic GERD    Overview   On PPI         Current Assessment & Plan   Will get EGD given his recent worsening of symptoms  while on PPI  In interim increase PPI to BID         Relevant Medications    pantoprazole (ProtoNix) 20 mg EC tablet    Other Relevant Orders    Esophagogastroduodenoscopy (EGD)    History of bladder cancer    Overview   1/21: NONINVASIVE PAPILLARY UROTHELIAL CARCINOMA, LOW GRADE  s/p removal  4/21 CT Urogram and CYstoscopy normal  Urine Cytolgy 4/22/24: Negative for high-grade urothelial carcinoma.   Managed by Urology [facility]  Annual Cystoscopy needed, last one normal         History of colon polyps    Relevant Orders    Colonoscopy Screening; High Risk Patient    History of thyroid cancer    Overview   Papillary carcinoma - treated with surgery (thyroidectomy) and radioactive iodine therapy  TSH should be 0.1 or higher per Endo (Epic message 5/4/23)         Relevant Orders    TSH with reflex to Free T4 if abnormal    Hypothyroidism, adult    Overview   post thyroidectomy from papillary CA.   TSH needs to be <0.04  on synthroid         Relevant Orders    TSH with reflex to Free T4 if abnormal    Lipid Panel    Iron deficiency    Overview   on PO supp  continue         Relevant Orders    Iron and TIBC    Vitamin B12    Leukopenia    Overview   6/26/20: 2.9  Benign cause chava  Will monitor  12/24 Peripheral smear :   Atypical lymphocytes and large granular lymphocytes are present in the peripheral  blood smear.  No blasts or nucleated red blood cells are noted.   S/p Hematology consult 2025: -CBC run freshly in office showed wbc 2.9, hgb 16.9, plt 196,000, ANC 1500, abs lymph 1130, abs monos 190, abs eos 30, abs basos 40  -on occasion he has had borderline lymphopenia  -flow cytometry: no immunophenotypic evidence of a lymphoproliferative disorder; no increased or abnormal blast population; no definitive abnormality of granulocytes, monocytes  -most likely his occasional borderline lymphocytopenia is a consequence of his having received CAMP in the past as treatment for thyroid cancer  -this is generally of no clinical consequence as flow cytometry was completely negative, and there is nothing that needs to be done about it  -offered to follow him annually vs return PRN         Relevant Orders    Comprehensive Metabolic Panel    CBC and Auto Differential    Urinalysis with Reflex Microscopic    Iron and TIBC    Vitamin B12    Lung mass    Overview   CT chest 12/24: Cystic lesion with peripheral soft tissue thickening in the right upper lobe measuring 0.7 cm which was not definitively visualized on the CT dated 04/20/2022  S/p Thoracic surgery consult [facility] 1/25: recommend surveillance imaging at this time.   Rec'd 3 mos f/up CT chest           Current Assessment & Plan   Awaiting CT Chest results  Done yesterday at [facility]         TTH (tension-type headache), infrequent episodic type    Overview   Tension and migraine.   Valium daily prn  Gabapentin helped  S/p Trigger Point injections  Seeing Neuro, exploring Botox         Relevant Orders    Referral to Chiropractic Medicine - (External)    Vitamin D deficiency    Overview   5/24: 27 on 1K daily  Goal 30-40 given hx kidney stones  Supratherapeutic on 5K daily  On 1K daily         Relevant Orders    Vitamin D 25-Hydroxy,Total (for eval of Vitamin D levels)    Iron and TIBC    Vitamin B12     Other Visit Diagnoses         Penicillin allergy        Relevant Orders     "Referral to Allergy      Hemorrhoids, unspecified hemorrhoid type        Relevant Medications    hydrocortisone (Anusol-HC) 2.5 % rectal cream      Suspicious nevus        Relevant Orders    Referral to Dermatology              ROS otherwise negative aside from what was mentioned above in HPI.    Vitals  /87   Pulse 73   Temp 36.3 °C (97.4 °F)   Ht 1.689 m (5' 6.5\")   Wt 66.2 kg (146 lb)   SpO2 97%   BMI 23.21 kg/m²   Body mass index is 23.21 kg/m².  Physical Exam  Gen: Alert, NAD  HEENT:  Normal exam  Neck:  No masses/nodes palpable; Thyroid nontender and without nodules; No EVELIO  Respiratory:  Lungs CTAB  CV: RRR  Neuro:  Gross motor and sensory intact  Skin:  New mole on scalp, > 6mm, darkly pigmented  Breast: No masses or axillary lymphadenopathy      Allergies and Medications  Penicillins, Linezolid, Pamelor [nortriptyline], Tamsulosin, Tizanidine, Vicodin [hydrocodone-acetaminophen], and Latex  Current Outpatient Medications   Medication Instructions    cholecalciferol (VITAMIN D-3) 25 mcg, Daily    diazePAM (VALIUM) 10 mg, oral, Every 6 hours PRN    ferrous sulfate 325 (65 Fe) MG tablet 65 mg of iron, Once Weekly    hydrocortisone (Anusol-HC) 2.5 % rectal cream rectal, 4 times daily PRN    levothyroxine (SYNTHROID, LEVOXYL) 125 mcg, Daily before breakfast    multivitamin tablet 1 tablet, Daily    pantoprazole (PROTONIX) 20 mg, oral, 2 times daily       Active Problem List  Problem List[1]    Comprehensive Medical/Surgical/Social/Family History  Medical History[2]  Surgical History[3]    Social History     Social History Narrative    Social History:    , 3 daughters    Worked as  for [employer], looking for a job    Nonsmoker    No ETOH    ---    Family History:    F: Diabetes, CAD/MI  ( 74)    M Kidney stones, hypotension, OA    S: Migraine, RA                             S:        B:                                   Aunts/cousin with rectal CA; Aunt other side of family has " rectal CA    2Aunt:  Colon Cancer          [1]   Patient Active Problem List  Diagnosis    BPH (benign prostatic hyperplasia)    Chronic GERD    Gilbert syndrome    H/O Helicobacter infection    Hiatal hernia    History of bladder cancer    History of colon polyps    History of thyroid cancer    Hypothyroidism, adult    Iron deficiency    Kidney stones    Localized primary osteoarthritis of carpometacarpal joint of left thumb    Lumbar disc disease    Lumbar spondylosis    Spinal stenosis of lumbar region    Spondylolisthesis, lumbar region    Spondylosis of cervical region without myelopathy or radiculopathy    TTH (tension-type headache), infrequent episodic type    Vitamin D deficiency    Routine adult health maintenance    Medication management    Vocal cord dysfunction    Leukopenia    Lung mass    Atypical lymphocytes present on peripheral blood smear    Cervicogenic headache   [2]   Past Medical History:  Diagnosis Date    H/O cervical spine x-ray 04/09/2024    Degenerative changes include disc space narrowing, endplate spurring, endplate sclerosis, uncovertebral spurring, posterior disc osteophyte complexes, and facet hypertrophy. There is mild subluxation at several levels, most likely related to facet degenerative disease. Degenerative changes predominantly involve the C4-C5 and C5-C6 levels    H/O chest x-ray 05/09/2024    normal    H/O CT scan     4/22: CT Calcium score: 0 The visualized segments of the lungs demonstrates minimal bullous emphysematous changes. There is basilar atelectasis.. 10/20: CT enterography: HH, moderately enlarged prostate 4/7/21: CT urogram: NO HYDRONEPHROSIS OR NEPHROLITHIASIS.  NO ABNORMALLY ENHANCED RENAL OR BLADDER MASS.  PROSTATE HYPERTROPHY.    H/O CT scan of abdomen     12/18: cyst left kidney 7mm, small HH 1/21: IMPRESSION: 3 mm obstructing calculus at the left ureterovesical junction resulting in mild hydroureter and hydronephrosis. Perinephric and periureteral stranding  is most likely related to the obstruction but infection is not excluded. Prostatomegaly.    H/O CT scan of abdomen 12/18/2024    The prostate gland is heterogeneous and enlarged measuring 5.0 cm Bilateral fat containing inguinal hernias are noted a small fat containing umbilical hernia is noted    H/O CT scan of brain     12/18: normal    H/O CT scan of chest     2016: Few scattered bilateral tiny nodular/groundglass opacities <4mm    H/O CT scan of chest 12/18/2024    Cystic lesion with peripheral soft tissue thickening in the right upper lobe measuring 0.7 cm which was not definitively visualized on the CT dated 04/20/2022. Mild upper lung predominant emphysematous changes are noted.    H/O diagnostic ultrasound     2015 US kidney: Normal; prostate gland is enlarged indenting the base of the urinary bladder , Left ureteral stent    H/O magnetic resonance imaging     2/12: mri SHOULDER: 2. Mild tendinosis of the distal supraspinatus tendon without discrete tear. 3. Chronic degenerative tear of the superior labrum within limits of non arthrographic study.    H/O magnetic resonance imaging of brain and brain stem 03/20/2024    Sequela of mild chronic small vessel ischemic changes, otherwise unremarkable MRI brain.    H/O magnetic resonance imaging of cervical spine     2/21: There is loss of height and signal and intervertebral disc spaces throughout the cervical spine. There are associated small bulging discs, uncovertebral joint osteophytes and facet hypertrophy. There is no measurable canal stenosis, focal disc herniation, cord compression or nerve root compression    H/O magnetic resonance imaging of lumbar spine     2009: Disc protrusions on the left at L4-5 and L5-S1, with impingement . s/p PT and injections; Central and slightly left-sided disk protrusion L5/S1 2017: Multilevel discogenic and facet hypertrophic degenerative change as  described above. Left laminotomy at L4 and L5. Recurrent disc  protrusion  versus granulation tissue/epidural scar at L4-L5 and contacts  the left L5 nerve root    History of PFTs     2015 (-)   [3]   Past Surgical History:  Procedure Laterality Date    BACK SURGERY  03/24/2020 7/19: revision decompression L5-S1 with fusion    BLADDER SURGERY  12/07/2018    Bladder CA removal 1/21 found incidentally on ureteroscopy low grade noninvasive urothelial cell carcinoma of the bladder    COLONOSCOPY  08/05/2020    5/15: Three polyps were found in the rectum.  measuring 3-5 mm; hyperplastic polyps; polyps (5 years) 8/2020: ileium normal; colon normal; no specimens; 5 y    CYSTOSCOPY  04/04/2022 4/23: Normal ;4/21: Normal ([facility]) 4/22: Normal aside from Prostate: Moderate lateral lobes with prolapse    CYSTOSCOPY  04/12/2024    prostate mod lateral lobes; TUR defect. trabeculation mild, inflammation mild. MARI 60 g no nodules    ESOPHAGOGASTRODUODENOSCOPY  08/05/2020 2005: SMALL HIATAL HERNIA, CHRONIC GASTRITIS, negative for H Pylori 2012 : normal 8/2020: small hiatal hernia    LUMBAR LAMINECTOMY  08/14/2019 7/19/19: 1. revision L4-S1 leminectomy. 2. Vila/radical decompression L5/S1, excision recurrent HNP. 3. PSF w instrumentation L5/S1. 4. TLIF w cage L5/S1    OTHER SURGICAL HISTORY  12/07/2018    Tonsillectomy    PROSTATE BIOPSY  03/24/2020 9/18: FINAL DIAGNOSIS  1. Prostate, left base, biopsy (A) - Benign prostate tissue.    THYROIDECTOMY  04/12/2021    Bladder surgery    UPPER GASTROINTESTINAL ENDOSCOPY  10/05/2020    Capsule endoscopy 10/20: ? debris vs ulceration noted at 3:57 35sec entended capsule time in stomach and at ileocecal valve Capsule remained in small bowel at end of study    URETEROSCOPY  04/12/2021 2/21: Left, stent placement, laser lithotripsy

## 2025-05-16 ENCOUNTER — TELEPHONE (OUTPATIENT)
Dept: GASTROENTEROLOGY | Facility: EXTERNAL LOCATION | Age: 60
End: 2025-05-16
Payer: COMMERCIAL

## 2025-06-02 DIAGNOSIS — Z12.11 COLON CANCER SCREENING: ICD-10-CM

## 2025-06-02 RX ORDER — POLYETHYLENE GLYCOL 3350, SODIUM SULFATE ANHYDROUS, SODIUM BICARBONATE, SODIUM CHLORIDE, POTASSIUM CHLORIDE 236; 22.74; 6.74; 5.86; 2.97 G/4L; G/4L; G/4L; G/4L; G/4L
4 POWDER, FOR SOLUTION ORAL ONCE
Qty: 4000 ML | Refills: 0 | Status: CANCELLED | OUTPATIENT
Start: 2025-06-02 | End: 2025-06-02

## 2025-06-04 ENCOUNTER — APPOINTMENT (OUTPATIENT)
Dept: HEMATOLOGY/ONCOLOGY | Facility: CLINIC | Age: 60
End: 2025-06-04
Payer: COMMERCIAL

## 2025-06-04 RX ORDER — SODIUM, POTASSIUM,MAG SULFATES 17.5-3.13G
SOLUTION, RECONSTITUTED, ORAL ORAL
Qty: 354 ML | Refills: 0 | Status: SHIPPED | OUTPATIENT
Start: 2025-06-04

## 2025-06-11 ENCOUNTER — APPOINTMENT (OUTPATIENT)
Dept: GASTROENTEROLOGY | Facility: HOSPITAL | Age: 60
End: 2025-06-11
Payer: MEDICAID

## 2025-06-23 ENCOUNTER — TELEPHONE (OUTPATIENT)
Dept: PRIMARY CARE | Facility: CLINIC | Age: 60
End: 2025-06-23
Payer: MEDICAID

## 2025-06-23 DIAGNOSIS — R31.9 HEMATURIA, UNSPECIFIED TYPE: ICD-10-CM

## 2025-06-23 NOTE — TELEPHONE ENCOUNTER
"Suspect the microscopic blood in the urine is due to dehydrated/fasting/first morning urine.  Would simply repeat it at some point -- but do it well hydrated (8-10 glasses water (64-80 oz) a day is the recommendation for adequate hydration), NOT fasting, and NOT first thing in the AM.    Order  Not urgent    T4 is normal so would manage the abnormal TSH based on clinical symptoms and/or feeling of wellbeing.   If feel fine, no changes.   However if any symptoms of hypothyroidism would treat accordingly.  Symptoms would include:   Fatigue and weakness,   Cold intolerance,   Shortness of breath on exertion,  Weight gain,  Cognitive dysfunction,  Constipation,  Dry skin,  Hoarseness,  Swelling in the legs,  Puffy face and loss of eyebrows,  Periorbital edema (swelling around the eyes),  Enlargement of the tongue,  Decreased hearing,  Myalgia (muscles aches) and paresthesia (numbness and tingling),  Depression,  Arthralgia (joint pain)    Stable leukopenia (low WBC)    Suspect the  isolated elevated bilirubin is likely due to Gilbert syndrome, a benign (not harmful) condition that has also been called \"constitutional hepatic dysfunction\" and \"familial nonhemolytic jaundice\".  It is characterized by an isolated slightly elevated bilirubin and normal liver function tests otherwise, triggered by dehydration/fasting, and patients are typically asymptomatic.  No treatment is necessary.    Add on Dorect bilirubin  If normal this confirmsit    "

## 2025-06-23 NOTE — TELEPHONE ENCOUNTER
Fax received from [facility].  Results will be scanned in to patients chart.  WBC 3.3 (L)  UA abnormal -color, clarity, protein (trace), leuk (2+), WBC    >20/HPF, RBC 3-5/HPF, Bacteria,uL (>9,821 (H))  Lipids HDL 35 (L), LDL (109)  CMP bilirubin 1.4 (H)

## 2026-06-09 ENCOUNTER — APPOINTMENT (OUTPATIENT)
Dept: PRIMARY CARE | Facility: CLINIC | Age: 61
End: 2026-06-09
Payer: COMMERCIAL